# Patient Record
Sex: FEMALE | Race: WHITE | NOT HISPANIC OR LATINO | Employment: FULL TIME | URBAN - METROPOLITAN AREA
[De-identification: names, ages, dates, MRNs, and addresses within clinical notes are randomized per-mention and may not be internally consistent; named-entity substitution may affect disease eponyms.]

---

## 2017-10-05 ENCOUNTER — APPOINTMENT (OUTPATIENT)
Dept: CT IMAGING | Facility: HOSPITAL | Age: 42
End: 2017-10-05

## 2017-10-05 ENCOUNTER — HOSPITAL ENCOUNTER (EMERGENCY)
Facility: HOSPITAL | Age: 42
Discharge: HOME | End: 2017-10-05
Attending: EMERGENCY MEDICINE | Admitting: EMERGENCY MEDICINE

## 2017-10-05 VITALS
DIASTOLIC BLOOD PRESSURE: 85 MMHG | TEMPERATURE: 98.1 F | WEIGHT: 170 LBS | RESPIRATION RATE: 18 BRPM | HEIGHT: 67 IN | OXYGEN SATURATION: 99 % | HEART RATE: 84 BPM | SYSTOLIC BLOOD PRESSURE: 124 MMHG | BODY MASS INDEX: 26.68 KG/M2

## 2017-10-05 DIAGNOSIS — K63.89 EPIPLOIC APPENDAGITIS: Primary | ICD-10-CM

## 2017-10-05 LAB
ALBUMIN SERPL-MCNC: 3.9 G/DL (ref 3.5–5.2)
ALBUMIN/GLOB SERPL: 1.5 G/DL
ALP SERPL-CCNC: 70 U/L (ref 40–129)
ALT SERPL W P-5'-P-CCNC: 12 U/L (ref 5–33)
ANION GAP SERPL CALCULATED.3IONS-SCNC: 11.7 MMOL/L
AST SERPL-CCNC: 13 U/L (ref 5–32)
BACTERIA UR QL AUTO: ABNORMAL /HPF
BASOPHILS # BLD AUTO: 0.04 10*3/MM3 (ref 0–0.2)
BASOPHILS NFR BLD AUTO: 0.6 % (ref 0–2)
BILIRUB SERPL-MCNC: 0.7 MG/DL (ref 0.2–1.2)
BILIRUB UR QL STRIP: NEGATIVE
BUN BLD-MCNC: 2 MG/DL (ref 6–20)
BUN/CREAT SERPL: 2.9 (ref 7–25)
CALCIUM SPEC-SCNC: 9.1 MG/DL (ref 8.6–10.5)
CHLORIDE SERPL-SCNC: 103 MMOL/L (ref 98–107)
CLARITY UR: CLEAR
CO2 SERPL-SCNC: 26.3 MMOL/L (ref 22–29)
COLOR UR: YELLOW
CREAT BLD-MCNC: 0.68 MG/DL (ref 0.57–1)
DEPRECATED RDW RBC AUTO: 44 FL (ref 37–54)
EOSINOPHIL # BLD AUTO: 0.25 10*3/MM3 (ref 0.1–0.3)
EOSINOPHIL NFR BLD AUTO: 3.9 % (ref 0–4)
ERYTHROCYTE [DISTWIDTH] IN BLOOD BY AUTOMATED COUNT: 12.8 % (ref 11.5–14.5)
GFR SERPL CREATININE-BSD FRML MDRD: 94 ML/MIN/1.73
GLOBULIN UR ELPH-MCNC: 2.6 GM/DL
GLUCOSE BLD-MCNC: 115 MG/DL (ref 65–99)
GLUCOSE UR STRIP-MCNC: NEGATIVE MG/DL
HCT VFR BLD AUTO: 41.5 % (ref 37–47)
HGB BLD-MCNC: 13.7 G/DL (ref 12–16)
HGB UR QL STRIP.AUTO: ABNORMAL
HYALINE CASTS UR QL AUTO: ABNORMAL /LPF
IMM GRANULOCYTES # BLD: 0.01 10*3/MM3 (ref 0–0.03)
IMM GRANULOCYTES NFR BLD: 0.2 % (ref 0–0.5)
KETONES UR QL STRIP: NEGATIVE
LEUKOCYTE ESTERASE UR QL STRIP.AUTO: NEGATIVE
LYMPHOCYTES # BLD AUTO: 2.31 10*3/MM3 (ref 0.6–4.8)
LYMPHOCYTES NFR BLD AUTO: 35.9 % (ref 20–45)
MCH RBC QN AUTO: 31 PG (ref 27–31)
MCHC RBC AUTO-ENTMCNC: 33 G/DL (ref 31–37)
MCV RBC AUTO: 93.9 FL (ref 81–99)
MONOCYTES # BLD AUTO: 0.4 10*3/MM3 (ref 0–1)
MONOCYTES NFR BLD AUTO: 6.2 % (ref 3–8)
NEUTROPHILS # BLD AUTO: 3.42 10*3/MM3 (ref 1.5–8.3)
NEUTROPHILS NFR BLD AUTO: 53.2 % (ref 45–70)
NITRITE UR QL STRIP: NEGATIVE
NRBC BLD MANUAL-RTO: 0 /100 WBC (ref 0–0)
PH UR STRIP.AUTO: 7.5 [PH] (ref 4.5–8)
PLATELET # BLD AUTO: 240 10*3/MM3 (ref 140–500)
PMV BLD AUTO: 9.5 FL (ref 7.4–10.4)
POTASSIUM BLD-SCNC: 3.8 MMOL/L (ref 3.5–5.2)
PROT SERPL-MCNC: 6.5 G/DL (ref 6–8.5)
PROT UR QL STRIP: NEGATIVE
RBC # BLD AUTO: 4.42 10*6/MM3 (ref 4.2–5.4)
RBC # UR: ABNORMAL /HPF
REF LAB TEST METHOD: ABNORMAL
SODIUM BLD-SCNC: 141 MMOL/L (ref 136–145)
SP GR UR STRIP: 1.01 (ref 1–1.03)
SQUAMOUS #/AREA URNS HPF: ABNORMAL /HPF
UROBILINOGEN UR QL STRIP: ABNORMAL
WBC NRBC COR # BLD: 6.43 10*3/MM3 (ref 4.8–10.8)
WBC UR QL AUTO: ABNORMAL /HPF

## 2017-10-05 PROCEDURE — 96376 TX/PRO/DX INJ SAME DRUG ADON: CPT

## 2017-10-05 PROCEDURE — 99284 EMERGENCY DEPT VISIT MOD MDM: CPT

## 2017-10-05 PROCEDURE — 85025 COMPLETE CBC W/AUTO DIFF WBC: CPT | Performed by: EMERGENCY MEDICINE

## 2017-10-05 PROCEDURE — 74177 CT ABD & PELVIS W/CONTRAST: CPT

## 2017-10-05 PROCEDURE — 96375 TX/PRO/DX INJ NEW DRUG ADDON: CPT

## 2017-10-05 PROCEDURE — 0 IOPAMIDOL PER 1 ML: Performed by: EMERGENCY MEDICINE

## 2017-10-05 PROCEDURE — 80053 COMPREHEN METABOLIC PANEL: CPT | Performed by: EMERGENCY MEDICINE

## 2017-10-05 PROCEDURE — 99284 EMERGENCY DEPT VISIT MOD MDM: CPT | Performed by: EMERGENCY MEDICINE

## 2017-10-05 PROCEDURE — 81001 URINALYSIS AUTO W/SCOPE: CPT | Performed by: EMERGENCY MEDICINE

## 2017-10-05 PROCEDURE — 25010000002 MORPHINE PER 10 MG: Performed by: EMERGENCY MEDICINE

## 2017-10-05 PROCEDURE — 0 DIATRIZOATE MEGLUMINE & SODIUM PER 1 ML: Performed by: EMERGENCY MEDICINE

## 2017-10-05 PROCEDURE — 25010000002 ONDANSETRON PER 1 MG: Performed by: EMERGENCY MEDICINE

## 2017-10-05 PROCEDURE — 96374 THER/PROPH/DIAG INJ IV PUSH: CPT

## 2017-10-05 RX ORDER — ONDANSETRON 2 MG/ML
4 INJECTION INTRAMUSCULAR; INTRAVENOUS EVERY 6 HOURS PRN
Status: DISCONTINUED | OUTPATIENT
Start: 2017-10-05 | End: 2017-10-06 | Stop reason: HOSPADM

## 2017-10-05 RX ORDER — ONDANSETRON 2 MG/ML
4 INJECTION INTRAMUSCULAR; INTRAVENOUS ONCE
Status: COMPLETED | OUTPATIENT
Start: 2017-10-05 | End: 2017-10-05

## 2017-10-05 RX ORDER — OXYCODONE HYDROCHLORIDE AND ACETAMINOPHEN 5; 325 MG/1; MG/1
1 TABLET ORAL EVERY 6 HOURS PRN
Qty: 25 TABLET | Refills: 0 | Status: SHIPPED | OUTPATIENT
Start: 2017-10-05 | End: 2017-11-01

## 2017-10-05 RX ORDER — ONDANSETRON 4 MG/1
4 TABLET, ORALLY DISINTEGRATING ORAL EVERY 6 HOURS PRN
Qty: 20 TABLET | Refills: 0 | Status: SHIPPED | OUTPATIENT
Start: 2017-10-05 | End: 2017-11-01 | Stop reason: ALTCHOICE

## 2017-10-05 RX ORDER — OXYCODONE HYDROCHLORIDE AND ACETAMINOPHEN 5; 325 MG/1; MG/1
2 TABLET ORAL ONCE
Status: COMPLETED | OUTPATIENT
Start: 2017-10-05 | End: 2017-10-05

## 2017-10-05 RX ADMIN — DIATRIZOATE MEGLUMINE AND DIATRIZOATE SODIUM 30 ML: 600; 100 SOLUTION ORAL; RECTAL at 19:10

## 2017-10-05 RX ADMIN — IOPAMIDOL 100 ML: 755 INJECTION, SOLUTION INTRAVENOUS at 21:19

## 2017-10-05 RX ADMIN — MORPHINE SULFATE 4 MG: 4 INJECTION, SOLUTION INTRAMUSCULAR; INTRAVENOUS at 20:47

## 2017-10-05 RX ADMIN — OXYCODONE HYDROCHLORIDE AND ACETAMINOPHEN 2 TABLET: 5; 325 TABLET ORAL at 22:40

## 2017-10-05 RX ADMIN — MORPHINE SULFATE 4 MG: 4 INJECTION, SOLUTION INTRAMUSCULAR; INTRAVENOUS at 19:00

## 2017-10-05 RX ADMIN — ONDANSETRON 4 MG: 2 INJECTION, SOLUTION INTRAMUSCULAR; INTRAVENOUS at 19:01

## 2017-10-05 RX ADMIN — ONDANSETRON 4 MG: 2 INJECTION, SOLUTION INTRAMUSCULAR; INTRAVENOUS at 20:46

## 2017-10-05 NOTE — ED NOTES
"Pt continues to work on oral contrast. States that pain has come down \"a lot\" since taking pain medicine. Rates abd pain 4/10     Colleen Wolfe RN  10/05/17 1934    "

## 2017-10-06 NOTE — ED PROVIDER NOTES
Subjective   Patient is a 41 y.o. female presenting with abdominal pain.   History provided by:  Patient  Abdominal Pain   Pain location:  LLQ  Pain quality: sharp    Pain radiates to:  Does not radiate  Pain severity:  Moderate  Onset quality:  Gradual  Timing:  Constant  Progression:  Worsening  Context comment:  As described below.  Relieved by:  Nothing  Worsened by:  Palpation  Associated symptoms: anorexia and nausea    Associated symptoms: no belching, no chest pain, no chills, no constipation, no cough, no diarrhea, no dysuria, no fever, no hematuria, no shortness of breath, no vaginal bleeding, no vaginal discharge and no vomiting    Risk factors: multiple surgeries      HPI Narrative:Ms. Zelaya is a 42 yo WF arisen secondary to left lower quadrant pain.  Onset of pain last night.  The pain has progressively worsened.  Patient is a mild nausea but no vomiting.  Patient has been told in the past that she had diverticulosis.  She is concerned that she may have diverticulitis.  Patient presents for evaluation.        Review of Systems   Constitutional: Negative.  Negative for appetite change, chills, diaphoresis and fever.   HENT: Negative.    Eyes: Negative.    Respiratory: Negative for cough, chest tightness, shortness of breath and wheezing.    Cardiovascular: Negative for chest pain, palpitations and leg swelling.   Gastrointestinal: Positive for abdominal pain, anorexia and nausea. Negative for constipation, diarrhea and vomiting.   Genitourinary: Negative.  Negative for difficulty urinating, dysuria, flank pain, frequency, hematuria, vaginal bleeding and vaginal discharge.   Musculoskeletal: Negative.    Skin: Negative.  Negative for color change, pallor and rash.   Neurological: Negative.  Negative for dizziness, seizures, syncope and headaches.   Psychiatric/Behavioral: Negative.  Negative for agitation, behavioral problems and hallucinations.       Past Medical History:   Diagnosis Date   • Anemia     • Diverticulitis    • GERD (gastroesophageal reflux disease)    • Hiatal hernia    • Menstrual abnormality     SCHEDULED FOR SX   • Migraine    • Ulcer of abdomen wall        No Known Allergies    Past Surgical History:   Procedure Laterality Date   • CHOLECYSTECTOMY N/A 9/23/2016    Procedure: CHOLECYSTECTOMY LAPAROSCOPIC;  Surgeon: Cordell Dejesus MD;  Location: [facility];  Service:    • D&C HYSTEROSCOPY MYOSURE N/A 8/10/2016    Procedure: DILATATION AND CURETTAGE HYSTEROSCOPY ;  Surgeon: Frank Alvarado MD;  Location: [facility] OR;  Service:    • DIAGNOSTIC LAPAROSCOPY     • ECTOPIC PREGNANCY SURGERY     • FOOT SURGERY Right     x3   • HYSTERECTOMY     • OR LAP, RADICAL HYST W/ TUBE&OV, NODE BX N/A 11/28/2016    Procedure: TOTAL LAPAROSCOPIC HYSTERECTOMY, bilateral salpingectomy, ablation of peritoneal endometriosis;  Surgeon: Frank Alvarado MD;  Location: [facility];  Service: Obstetrics/Gynecology   • TUBAL ABDOMINAL LIGATION         Family History   Problem Relation Age of Onset   • Hypertension Mother    • Cancer Father    • Hypertension Father        Social History     Social History   • Marital status: Single     Spouse name: N/A   • Number of children: N/A   • Years of education: N/A     Social History Main Topics   • Smoking status: Current Every Day Smoker     Packs/day: 1.00     Last attempt to quit: 7/17/2016   • Smokeless tobacco: Never Used   • Alcohol use No   • Drug use: No   • Sexual activity: Defer     Other Topics Concern   • None     Social History Narrative   • None             Objective   Physical Exam   Constitutional: She is oriented to person, place, and time. She appears well-developed and well-nourished. No distress.   41-year-old white female laying in bed.  Patient appears in pain but in good overall health.   HENT:   Head: Normocephalic and atraumatic.   Right Ear: External ear normal.   Left Ear: External ear normal.   Nose: Nose normal.   Mouth/Throat: Oropharynx  is clear and moist.   Eyes: Conjunctivae and EOM are normal. Pupils are equal, round, and reactive to light.   Neck: Normal range of motion. Neck supple.   Cardiovascular: Normal rate, regular rhythm, normal heart sounds and intact distal pulses.  Exam reveals no gallop and no friction rub.    No murmur heard.  Pulmonary/Chest: Effort normal and breath sounds normal.   Abdominal: Soft. Bowel sounds are normal. She exhibits no distension. There is no hepatosplenomegaly. There is tenderness in the left lower quadrant. There is no rigidity, no rebound, no guarding, no CVA tenderness, no tenderness at McBurney's point and negative Nuno's sign. No hernia.   Musculoskeletal: Normal range of motion.   Neurological: She is alert and oriented to person, place, and time. She has normal reflexes.   Skin: Skin is warm and dry. She is not diaphoretic.   Psychiatric: She has a normal mood and affect. Her behavior is normal.   Nursing note and vitals reviewed.      Procedures         ED Course  ED Course   Comment By Time   10/05/17  6:52 PM  Onset of left lower quadrant pain last night.  Associated nausea.  Physical exam consistent with diverticulitis.  Given pain medication, antiemetics obtaining CT. Tor Clarke MD 10/05 1852   10/05/17  8:31 PM  Patient's lab work is unremarkable.  A urine sample was contaminated.  No evidence of UTI.  Patient feeling better after morphine and Zofran.  Awaiting CT. Tor Clarke MD 10/05 2031   10/05/17  10:33 PM  Shows epiploic appendagitis in distal descending colon.  This is consistent with patient's symptoms.  Will prescribe pain and nausea medicine as needed for home.  This is a self-limiting disease.  This treatment is symptomatic. Tor Clarke MD 10/05 1914      Labs Reviewed   URINALYSIS W/ CULTURE IF INDICATED - Abnormal; Notable for the following:        Result Value    Blood, UA Trace (*)     All other components within normal limits   URINALYSIS, MICROSCOPIC ONLY -  Abnormal; Notable for the following:     RBC, UA 6-12 (*)     WBC, UA 3-5 (*)     Squamous Epithelial Cells, UA 3-6 (*)     All other components within normal limits   COMPREHENSIVE METABOLIC PANEL - Abnormal; Notable for the following:     Glucose 115 (*)     BUN 2 (*)     BUN/Creatinine Ratio 2.9 (*)     All other components within normal limits   CBC WITH AUTO DIFFERENTIAL - Normal   CBC AND DIFFERENTIAL    Narrative:     The following orders were created for panel order CBC & Differential.  Procedure                               Abnormality         Status                     ---------                               -----------         ------                     CBC Auto Differential[[id number]]         Normal              Final result                 Please view results for these tests on the individual orders.     My differential diagnosis for abdominal pain includes but is not limited to:  Gastritis, gastroenteritis, peptic ulcer disease, GERD, esophageal perforation, acute appendicitis, mesenteric adenitis, Meckel’s diverticulum, epiploic appendagitis, diverticulitis, colon cancer, ulcerative colitis, Crohn’s disease, intussusception, small bowel obstruction, adhesions, ischemic bowel, perforated viscus, ileus, obstipation, biliary colic, cholecystitis, cholelithiasis, Von-Roberto Abner, hepatitis, pancreatitis, common bile duct obstruction, cholangitis, bile leak, splenic trauma, splenic rupture, splenic infarction, splenic abscess, abdominal abscess, ascites, spontaneous bacterial peritonitis, hernia, UTI, cystitis, prostatitis, ureterolithiasis, urinary obstruction, ovarian cyst, torsion, pregnancy, ectopic pregnancy, PID, pelvic abscess, mittelschmerz, endometriosis, AAA, myocardial infarction, pneumonia, cancer, porphyria, DKA, medications, sickle cell, viral syndrome, zoster            MDM  Number of Diagnoses or Management Options  Epiploic appendagitis: new and requires workup     Amount and/or  Complexity of Data Reviewed  Clinical lab tests: reviewed and ordered  Tests in the radiology section of CPT®: reviewed and ordered  Independent visualization of images, tracings, or specimens: yes    Risk of Complications, Morbidity, and/or Mortality  Presenting problems: moderate  Diagnostic procedures: high  Management options: low    Patient Progress  Patient progress: improved      Final diagnoses:   Epiploic appendagitis            Tor Clarke MD  10/06/17 0318

## 2017-10-06 NOTE — ED NOTES
Pt appears tearful in bed. Family at bedside. States that her pain went away briefly and is now coming back. Rates pain 6/10.     Colleen Wolfe RN  10/05/17 4938

## 2017-10-06 NOTE — DISCHARGE INSTRUCTIONS
Medication as directed.  Follow-up with PMD as above.  Return to ED for worsening symptoms, medical emergencies.

## 2017-11-01 ENCOUNTER — OFFICE VISIT (OUTPATIENT)
Dept: SURGERY | Facility: CLINIC | Age: 42
End: 2017-11-01

## 2017-11-01 VITALS
BODY MASS INDEX: 27 KG/M2 | SYSTOLIC BLOOD PRESSURE: 118 MMHG | HEART RATE: 72 BPM | RESPIRATION RATE: 16 BRPM | DIASTOLIC BLOOD PRESSURE: 74 MMHG | HEIGHT: 66 IN | WEIGHT: 168 LBS

## 2017-11-01 DIAGNOSIS — R10.32 LLQ PAIN: Primary | ICD-10-CM

## 2017-11-01 DIAGNOSIS — R11.0 NAUSEA: ICD-10-CM

## 2017-11-01 DIAGNOSIS — R19.7 DIARRHEA, UNSPECIFIED TYPE: ICD-10-CM

## 2017-11-01 DIAGNOSIS — R63.0 LOSS OF APPETITE: ICD-10-CM

## 2017-11-01 DIAGNOSIS — R14.0 BLOATING: ICD-10-CM

## 2017-11-01 PROCEDURE — 99204 OFFICE O/P NEW MOD 45 MIN: CPT | Performed by: SURGERY

## 2017-11-01 NOTE — PROGRESS NOTES
Ingrid Zelaya 42 y.o. female presents @ the req of RANDI Baldwin for eval of LLQ pain.  Pt reports + pain X sev days and nausea.+ bloating, chg in bowel habits, and loss of appetite.  Pt was sent to [facility] for CT recently.        HPI   Above noted and agree.  On October 5, this very pleasant patient developed severe LLQ pain.  It was sharp, severe, and constant.  She was sent to [place] [facility] and had a CT scan which showed epiploic appendigitis.  Her severe pain improved.  Now she has transient LLQ pain.  She gets nauseated after eating and gets severe diarrhea as well.  She has lost her appetite and gets very bloated.  Her last colonoscopy and EGD were 11 years ago.  She denies fevers or chills.  She has no chest pain or shortness of breath.        Review of Systems   All other systems reviewed and are negative.          Past Medical History:   Diagnosis Date   • Anemia    • Diverticulitis    • GERD (gastroesophageal reflux disease)    • Hiatal hernia    • Menstrual abnormality     SCHEDULED FOR SX   • Migraine    • Ulcer of abdomen wall            Past Surgical History:   Procedure Laterality Date   • CHOLECYSTECTOMY N/A 9/23/2016    Procedure: CHOLECYSTECTOMY LAPAROSCOPIC;  Surgeon: Cordell Dejesus MD;  Location: [facility];  Service:    • D&C HYSTEROSCOPY MYOSURE N/A 8/10/2016    Procedure: DILATATION AND CURETTAGE HYSTEROSCOPY ;  Surgeon: Frank Alvarado MD;  Location: [facility] OR;  Service:    • DIAGNOSTIC LAPAROSCOPY     • ECTOPIC PREGNANCY SURGERY     • FOOT SURGERY Right     x3   • HYSTERECTOMY     • CT LAP, RADICAL HYST W/ TUBE&OV, NODE BX N/A 11/28/2016    Procedure: TOTAL LAPAROSCOPIC HYSTERECTOMY, bilateral salpingectomy, ablation of peritoneal endometriosis;  Surgeon: Frank Alvarado MD;  Location: [facility];  Service: Obstetrics/Gynecology   • TUBAL ABDOMINAL LIGATION             Physical Exam   Constitutional: She is oriented to person, place, and time. She appears well-developed  "and well-nourished.   HENT:   Head: Normocephalic and atraumatic.   Neck: Neck supple.   Cardiovascular: Normal rate and regular rhythm.    Pulmonary/Chest: Effort normal and breath sounds normal.   Abdominal: Soft. Bowel sounds are normal.   LLQ tenderness of palpation   Musculoskeletal: She exhibits no edema.   Neurological: She is alert and oriented to person, place, and time.   Skin: Skin is warm and dry.   Psychiatric: She has a normal mood and affect. Her behavior is normal.   Nursing note and vitals reviewed.          /74  Pulse 72  Resp 16  Ht 66\" (167.6 cm)  Wt 168 lb (76.2 kg)  LMP 11/24/2016  BMI 27.12 kg/m2        Ingrid was seen today for abdominal pain.    Diagnoses and all orders for this visit:    LLQ pain    Nausea    Diarrhea, unspecified type    Bloating    Loss of appetite    We will schedule Ingrid for an EGD and colonoscopy.  I discussed with the patient the benefits and risks of performing endoscopy.  Benefits and risks were not limited to but including bleeding, infection, perforation, complications of anesthesia, aspiration.  The patient appeared to understand and is willing to proceed.    Thank you for allowing me to participate in the care of this interesting patient.      "

## 2017-11-01 NOTE — H&P
Ingrid Zelaya 42 y.o. female presents @ the req of RANDI Baldwin for eval of LLQ pain.  Pt reports + pain X sev days and nausea.+ bloating, chg in bowel habits, and loss of appetite.  Pt was sent to [facility] for CT recently.        HPI   Above noted and agree.  On October 5, this very pleasant patient developed severe LLQ pain.  It was sharp, severe, and constant.  She was sent to [place] ER and had a CT scan which showed epiploic appendigitis.  Her severe pain improved.  Now she has transient LLQ pain.  She gets nauseated after eating and gets severe diarrhea as well.  She has lost her appetite and gets very bloated.  Her last colonoscopy and EGD were 11 years ago.  She denies fevers or chills.  She has no chest pain or shortness of breath.        Review of Systems   All other systems reviewed and are negative.          Past Medical History:   Diagnosis Date   • Anemia    • Diverticulitis    • GERD (gastroesophageal reflux disease)    • Hiatal hernia    • Menstrual abnormality     SCHEDULED FOR SX   • Migraine    • Ulcer of abdomen wall            Past Surgical History:   Procedure Laterality Date   • CHOLECYSTECTOMY N/A 9/23/2016    Procedure: CHOLECYSTECTOMY LAPAROSCOPIC;  Surgeon: Cordell Dejesus MD;  Location: [facility];  Service:    • D&C HYSTEROSCOPY MYOSURE N/A 8/10/2016    Procedure: DILATATION AND CURETTAGE HYSTEROSCOPY ;  Surgeon: Frank Alvarado MD;  Location: [facility] OR;  Service:    • DIAGNOSTIC LAPAROSCOPY     • ECTOPIC PREGNANCY SURGERY     • FOOT SURGERY Right     x3   • HYSTERECTOMY     • [place] LAP, RADICAL HYST W/ TUBE&OV, NODE BX N/A 11/28/2016    Procedure: TOTAL LAPAROSCOPIC HYSTERECTOMY, bilateral salpingectomy, ablation of peritoneal endometriosis;  Surgeon: Frank Alvarado MD;  Location: [facility];  Service: Obstetrics/Gynecology   • TUBAL ABDOMINAL LIGATION             Physical Exam   Constitutional: She is oriented to person, place, and time. She appears well-developed  "and well-nourished.   HENT:   Head: Normocephalic and atraumatic.   Neck: Neck supple.   Cardiovascular: Normal rate and regular rhythm.    Pulmonary/Chest: Effort normal and breath sounds normal.   Abdominal: Soft. Bowel sounds are normal.   LLQ tenderness of palpation   Musculoskeletal: She exhibits no edema.   Neurological: She is alert and oriented to person, place, and time.   Skin: Skin is warm and dry.   Psychiatric: She has a normal mood and affect. Her behavior is normal.   Nursing note and vitals reviewed.          /74  Pulse 72  Resp 16  Ht 66\" (167.6 cm)  Wt 168 lb (76.2 kg)  LMP 11/24/2016  BMI 27.12 kg/m2        Ingrid was seen today for abdominal pain.    Diagnoses and all orders for this visit:    LLQ pain    Nausea    Diarrhea, unspecified type    Bloating    Loss of appetite    We will schedule Ingrid for an EGD and colonoscopy.  I discussed with the patient the benefits and risks of performing endoscopy.  Benefits and risks were not limited to but including bleeding, infection, perforation, complications of anesthesia, aspiration.  The patient appeared to understand and is willing to proceed.    Thank you for allowing me to participate in the care of this interesting patient.        "

## 2017-11-13 ENCOUNTER — ANESTHESIA EVENT (OUTPATIENT)
Dept: PERIOP | Facility: HOSPITAL | Age: 42
End: 2017-11-13

## 2017-11-14 ENCOUNTER — HOSPITAL ENCOUNTER (OUTPATIENT)
Facility: HOSPITAL | Age: 42
Setting detail: HOSPITAL OUTPATIENT SURGERY
Discharge: HOME | End: 2017-11-14
Attending: SURGERY | Admitting: SURGERY

## 2017-11-14 ENCOUNTER — ANESTHESIA (OUTPATIENT)
Dept: PERIOP | Facility: HOSPITAL | Age: 42
End: 2017-11-14

## 2017-11-14 VITALS
TEMPERATURE: 97.7 F | DIASTOLIC BLOOD PRESSURE: 65 MMHG | RESPIRATION RATE: 12 BRPM | SYSTOLIC BLOOD PRESSURE: 102 MMHG | HEART RATE: 72 BPM | OXYGEN SATURATION: 97 %

## 2017-11-14 DIAGNOSIS — R19.7 DIARRHEA, UNSPECIFIED TYPE: ICD-10-CM

## 2017-11-14 DIAGNOSIS — R14.0 BLOATING: ICD-10-CM

## 2017-11-14 DIAGNOSIS — R10.32 LLQ PAIN: ICD-10-CM

## 2017-11-14 DIAGNOSIS — R11.0 NAUSEA: ICD-10-CM

## 2017-11-14 DIAGNOSIS — R63.0 LOSS OF APPETITE: ICD-10-CM

## 2017-11-14 PROCEDURE — 45385 COLONOSCOPY W/LESION REMOVAL: CPT | Performed by: SURGERY

## 2017-11-14 PROCEDURE — 87081 CULTURE SCREEN ONLY: CPT | Performed by: SURGERY

## 2017-11-14 PROCEDURE — 43239 EGD BIOPSY SINGLE/MULTIPLE: CPT | Performed by: SURGERY

## 2017-11-14 PROCEDURE — 45380 COLONOSCOPY AND BIOPSY: CPT | Performed by: SURGERY

## 2017-11-14 PROCEDURE — 25010000002 PROPOFOL 10 MG/ML EMULSION: Performed by: NURSE ANESTHETIST, CERTIFIED REGISTERED

## 2017-11-14 RX ORDER — SODIUM CHLORIDE 9 MG/ML
40 INJECTION, SOLUTION INTRAVENOUS AS NEEDED
Status: DISCONTINUED | OUTPATIENT
Start: 2017-11-14 | End: 2017-11-14 | Stop reason: HOSPADM

## 2017-11-14 RX ORDER — LIDOCAINE HYDROCHLORIDE 20 MG/ML
INJECTION, SOLUTION INFILTRATION; PERINEURAL AS NEEDED
Status: DISCONTINUED | OUTPATIENT
Start: 2017-11-14 | End: 2017-11-14 | Stop reason: SURG

## 2017-11-14 RX ORDER — SODIUM CHLORIDE, SODIUM LACTATE, POTASSIUM CHLORIDE, CALCIUM CHLORIDE 600; 310; 30; 20 MG/100ML; MG/100ML; MG/100ML; MG/100ML
9 INJECTION, SOLUTION INTRAVENOUS CONTINUOUS PRN
Status: DISCONTINUED | OUTPATIENT
Start: 2017-11-14 | End: 2017-11-14 | Stop reason: HOSPADM

## 2017-11-14 RX ORDER — SODIUM CHLORIDE 0.9 % (FLUSH) 0.9 %
1-10 SYRINGE (ML) INJECTION AS NEEDED
Status: DISCONTINUED | OUTPATIENT
Start: 2017-11-14 | End: 2017-11-14 | Stop reason: HOSPADM

## 2017-11-14 RX ORDER — LIDOCAINE HYDROCHLORIDE 10 MG/ML
0.5 INJECTION, SOLUTION EPIDURAL; INFILTRATION; INTRACAUDAL; PERINEURAL ONCE AS NEEDED
Status: COMPLETED | OUTPATIENT
Start: 2017-11-14 | End: 2017-11-14

## 2017-11-14 RX ORDER — PROPOFOL 10 MG/ML
VIAL (ML) INTRAVENOUS AS NEEDED
Status: DISCONTINUED | OUTPATIENT
Start: 2017-11-14 | End: 2017-11-14 | Stop reason: SURG

## 2017-11-14 RX ORDER — MAGNESIUM HYDROXIDE 1200 MG/15ML
LIQUID ORAL AS NEEDED
Status: DISCONTINUED | OUTPATIENT
Start: 2017-11-14 | End: 2017-11-14 | Stop reason: HOSPADM

## 2017-11-14 RX ADMIN — SODIUM CHLORIDE, POTASSIUM CHLORIDE, SODIUM LACTATE AND CALCIUM CHLORIDE: 600; 310; 30; 20 INJECTION, SOLUTION INTRAVENOUS at 09:15

## 2017-11-14 RX ADMIN — PROPOFOL 50 MG: 10 INJECTION, EMULSION INTRAVENOUS at 09:26

## 2017-11-14 RX ADMIN — PROPOFOL 50 MG: 10 INJECTION, EMULSION INTRAVENOUS at 09:23

## 2017-11-14 RX ADMIN — PROPOFOL 50 MG: 10 INJECTION, EMULSION INTRAVENOUS at 09:22

## 2017-11-14 RX ADMIN — PROPOFOL 50 MG: 10 INJECTION, EMULSION INTRAVENOUS at 09:30

## 2017-11-14 RX ADMIN — LIDOCAINE HYDROCHLORIDE 100 MG: 20 INJECTION, SOLUTION INFILTRATION; PERINEURAL at 09:21

## 2017-11-14 RX ADMIN — SODIUM CHLORIDE, POTASSIUM CHLORIDE, SODIUM LACTATE AND CALCIUM CHLORIDE 9 ML/HR: 600; 310; 30; 20 INJECTION, SOLUTION INTRAVENOUS at 08:52

## 2017-11-14 RX ADMIN — PROPOFOL 50 MG: 10 INJECTION, EMULSION INTRAVENOUS at 09:21

## 2017-11-14 RX ADMIN — LIDOCAINE HYDROCHLORIDE 0.5 ML: 10 INJECTION, SOLUTION EPIDURAL; INFILTRATION; INTRACAUDAL; PERINEURAL at 08:51

## 2017-11-14 RX ADMIN — PROPOFOL 50 MG: 10 INJECTION, EMULSION INTRAVENOUS at 09:34

## 2017-11-14 NOTE — PLAN OF CARE
Problem: Patient Care Overview (Adult)  Goal: Adult Individualization and Mutuality  Outcome: Ongoing (interventions implemented as appropriate)    11/14/17 0902   Individualization   Patient Specific Preferences goes by alexx   Patient Specific Goals go home today   Patient Specific Interventions none needed   Mutuality/Individual Preferences   What Anxieties, Fears or Concerns Do You Have About Your Health or Care? afaid of being awake   What Questions Do You Have About Your Health or Care? none    What Information Would Help Us Give You More Personalized Care? i was awake ten years ago when they did this

## 2017-11-14 NOTE — ANESTHESIA POSTPROCEDURE EVALUATION
Patient: Ingrid Zelaya    Procedure Summary     Date Anesthesia Start Anesthesia Stop Room / Location    11/14/17 0916 0941 [facility]       Procedure Diagnosis Surgeon Provider    ESOPHAGOGASTRODUODENOSCOPY  with gastric biopsy for  SHERI TEST, POLYPECTOMY  (N/A Esophagus); COLONOSCOPY,   polypectomy  (N/A ) Loss of appetite; Nausea; Bloating; LLQ pain; Diarrhea, unspecified type  (Loss of appetite [R63.0]; Nausea [R11.0]; Bloating [R14.0]; LLQ pain [R10.32]; Diarrhea, unspecified type [R19.7]) DO Tommy Davenport, CRNA          Anesthesia Type: MAC  Last vitals  BP   102/65 (11/14/17 0955)   Temp   97.7 °F (36.5 °C) (11/14/17 0955)   Pulse   72 (11/14/17 0955)   Resp   12 (11/14/17 0955)     SpO2   97 % (11/14/17 0955)     Post Anesthesia Care and Evaluation    Patient location during evaluation: PHASE II  Patient participation: complete - patient participated  Level of consciousness: awake and alert  Pain score: 0  Pain management: adequate  Airway patency: patent  Anesthetic complications: No anesthetic complications  PONV Status: none  Cardiovascular status: acceptable  Respiratory status: acceptable  Hydration status: acceptable

## 2017-11-14 NOTE — H&P (VIEW-ONLY)
Ingrid Zleaya 42 y.o. female presents @ the req of RANDI Baldwin for eval of LLQ pain.  Pt reports + pain X sev days and nausea.+ bloating, chg in bowel habits, and loss of appetite.  Pt was sent to [facility] for CT recently.        HPI   Above noted and agree.  On October 5, this very pleasant patient developed severe LLQ pain.  It was sharp, severe, and constant.  She was sent to [place] ER and had a CT scan which showed epiploic appendigitis.  Her severe pain improved.  Now she has transient LLQ pain.  She gets nauseated after eating and gets severe diarrhea as well.  She has lost her appetite and gets very bloated.  Her last colonoscopy and EGD were 11 years ago.  She denies fevers or chills.  She has no chest pain or shortness of breath.        Review of Systems   All other systems reviewed and are negative.          Past Medical History:   Diagnosis Date   • Anemia    • Diverticulitis    • GERD (gastroesophageal reflux disease)    • Hiatal hernia    • Menstrual abnormality     SCHEDULED FOR SX   • Migraine    • Ulcer of abdomen wall            Past Surgical History:   Procedure Laterality Date   • CHOLECYSTECTOMY N/A 9/23/2016    Procedure: CHOLECYSTECTOMY LAPAROSCOPIC;  Surgeon: Cordell Dejesus MD;  Location: [facility];  Service:    • D&C HYSTEROSCOPY MYOSURE N/A 8/10/2016    Procedure: DILATATION AND CURETTAGE HYSTEROSCOPY ;  Surgeon: Frank Alvarado MD;  Location: [facility] OR;  Service:    • DIAGNOSTIC LAPAROSCOPY     • ECTOPIC PREGNANCY SURGERY     • FOOT SURGERY Right     x3   • HYSTERECTOMY     • [place] LAP, RADICAL HYST W/ TUBE&OV, NODE BX N/A 11/28/2016    Procedure: TOTAL LAPAROSCOPIC HYSTERECTOMY, bilateral salpingectomy, ablation of peritoneal endometriosis;  Surgeon: Frank Alvarado MD;  Location: [facility];  Service: Obstetrics/Gynecology   • TUBAL ABDOMINAL LIGATION             Physical Exam   Constitutional: She is oriented to person, place, and time. She appears well-developed  "and well-nourished.   HENT:   Head: Normocephalic and atraumatic.   Neck: Neck supple.   Cardiovascular: Normal rate and regular rhythm.    Pulmonary/Chest: Effort normal and breath sounds normal.   Abdominal: Soft. Bowel sounds are normal.   LLQ tenderness of palpation   Musculoskeletal: She exhibits no edema.   Neurological: She is alert and oriented to person, place, and time.   Skin: Skin is warm and dry.   Psychiatric: She has a normal mood and affect. Her behavior is normal.   Nursing note and vitals reviewed.          /74  Pulse 72  Resp 16  Ht 66\" (167.6 cm)  Wt 168 lb (76.2 kg)  LMP 11/24/2016  BMI 27.12 kg/m2        Ingrid was seen today for abdominal pain.    Diagnoses and all orders for this visit:    LLQ pain    Nausea    Diarrhea, unspecified type    Bloating    Loss of appetite    We will schedule Ingrid for an EGD and colonoscopy.  I discussed with the patient the benefits and risks of performing endoscopy.  Benefits and risks were not limited to but including bleeding, infection, perforation, complications of anesthesia, aspiration.  The patient appeared to understand and is willing to proceed.    Thank you for allowing me to participate in the care of this interesting patient.      "

## 2017-11-14 NOTE — PLAN OF CARE
Problem: Patient Care Overview (Adult)  Goal: Adult Individualization and Mutuality  Outcome: Outcome(s) achieved Date Met:  11/14/17

## 2017-11-14 NOTE — PLAN OF CARE
Problem: Patient Care Overview (Adult)  Goal: Plan of Care Review  Outcome: Ongoing (interventions implemented as appropriate)    11/14/17 0902   Coping/Psychosocial Response Interventions   Plan Of Care Reviewed With patient   Patient Care Overview   Progress improving   Outcome Evaluation   Outcome Summary/Follow up Plan denies pain or alittle nausea now

## 2017-11-14 NOTE — ANESTHESIA PREPROCEDURE EVALUATION
Anesthesia Evaluation     Patient summary reviewed and Nursing notes reviewed   no history of anesthetic complications:  NPO Solid Status: > 8 hours  NPO Liquid Status: > 8 hours     Airway   Mallampati: I  TM distance: >3 FB  Neck ROM: full  no difficulty expected  Dental - normal exam     Pulmonary - normal exam    breath sounds clear to auscultation  (+) a smoker (1 ppd) Current Abstained day of surgery,   (-) sleep apnea  Cardiovascular - normal exam  Exercise tolerance: good (4-7 METS)    Rhythm: regular  Rate: normal        Neuro/Psych  (+) headaches (migraines, last 2 weeks ago),    GI/Hepatic/Renal/Endo    (+)  hiatal hernia, GERD, PUD (history of),     Musculoskeletal     Abdominal  - normal exam   Substance History - negative use     OB/GYN      Comment: DUB      Other   (+) arthritis (knees)                                     Anesthesia Plan    ASA 2     MAC     intravenous induction   Anesthetic plan and risks discussed with patient and child.

## 2017-11-14 NOTE — PLAN OF CARE
Problem: Perioperative Period (Adult)  Goal: Signs and Symptoms of Listed Potential Problems Will be Absent or Manageable (Perioperative Period)  Outcome: Outcome(s) achieved Date Met:  11/14/17 11/14/17 0936   Perioperative Period   Problems Assessed (Perioperative Period) all   Problems Present (Perioperative Period) none

## 2017-11-14 NOTE — PLAN OF CARE
Problem: Patient Care Overview (Adult)  Goal: Plan of Care Review  Outcome: Outcome(s) achieved Date Met:  11/14/17

## 2017-11-14 NOTE — OP NOTE
EGD and Colonoscopy Procedure Note      Pre-operative Diagnosis:  Loss of appetite [R63.0]  Nausea [R11.0]  Bloating [R14.0]  LLQ pain [R10.32]  Diarrhea, unspecified type [R19.7]    Post-operative Diagnosis: GERD, gastric polyps, cecum polyps x 2, rectal polyps    Procedure:  EGD with biopsy for H. Pylori with cold forceps and GE junction biopsy with cold forceps and polypectomy with cold forceps and  Colonoscopy with polypectomy with hot snare and cold forceps     Surgeon: Kadeemrenoushana    Anesthetic: MAC     Estimated Blood Loss:  minimal    Complications:  none    Indications:  See preop diagnosis    Findings/Treatments:   GERD, gastric polyps--biopsies and removal with cold forceps  Polyps of colon--removed with hot snare and cold forceps       Scope Withdrawal Time:  > 6 minutes      Recommendations:  Await pathology      Procedure Details     After discussing the benefits and risks of an EGD and Colonoscopy, benefits and risks not limited to but including:  Bleeding, infection, perforation, aspiration; informed consent was signed.  The patient was taken into the endoscopy suite at [facility] and placed in the left lateral decubitus position.  MAC anesthesia was induced under appropriate monitoring.  Bite block was placed and the gastroscope was inserted thru such and advanced under direct vision to second portion of the duodenum.  A careful inspection was made as the gastroscope was withdrawn, including a retroflexed view of the proximal stomach; findings and interventions are described below.  If biopsies were taken, this was done with the cold biopsy forceps.    The bed was then rotated 108 degrees.  A rectal exam was performed.  Sphincter tone was normal.  The colonoscope was then inserted and carefully advanced to the cecum while visualizing the mucosa.  The cecum was identified by the ileocecal valve and the orifice of the appendix.  Once in the cecum, there were two polyps removed with  hot snare.  The scope was then withdrawn while continuing to evaluate the mucosa and deflate air.  There were two rectal polyps which were removed with cold forceps.  The scope was then withdrawn and Ingrid was taken to the recovery area in stable condition having tolerated her procedure well.     Anca Kay DO

## 2017-11-15 ENCOUNTER — HOSPITAL ENCOUNTER (EMERGENCY)
Facility: HOSPITAL | Age: 42
Discharge: HOME | End: 2017-11-15
Attending: EMERGENCY MEDICINE | Admitting: EMERGENCY MEDICINE

## 2017-11-15 ENCOUNTER — APPOINTMENT (OUTPATIENT)
Dept: CT IMAGING | Facility: HOSPITAL | Age: 42
End: 2017-11-15

## 2017-11-15 ENCOUNTER — TELEPHONE (OUTPATIENT)
Dept: SURGERY | Facility: CLINIC | Age: 42
End: 2017-11-15

## 2017-11-15 VITALS
OXYGEN SATURATION: 97 % | RESPIRATION RATE: 16 BRPM | HEIGHT: 67 IN | DIASTOLIC BLOOD PRESSURE: 78 MMHG | BODY MASS INDEX: 26.68 KG/M2 | SYSTOLIC BLOOD PRESSURE: 112 MMHG | WEIGHT: 170 LBS | TEMPERATURE: 98.5 F | HEART RATE: 80 BPM

## 2017-11-15 DIAGNOSIS — R10.30 LOWER ABDOMINAL PAIN: Primary | ICD-10-CM

## 2017-11-15 LAB
ALBUMIN SERPL-MCNC: 3.8 G/DL (ref 3.5–5.2)
ALBUMIN/GLOB SERPL: 1.4 G/DL
ALP SERPL-CCNC: 65 U/L (ref 40–129)
ALT SERPL W P-5'-P-CCNC: 11 U/L (ref 5–33)
ANION GAP SERPL CALCULATED.3IONS-SCNC: 7.3 MMOL/L
AST SERPL-CCNC: 15 U/L (ref 5–32)
BASOPHILS # BLD AUTO: 0.06 10*3/MM3 (ref 0–0.2)
BASOPHILS NFR BLD AUTO: 0.7 % (ref 0–2)
BILIRUB SERPL-MCNC: 0.4 MG/DL (ref 0.2–1.2)
BUN BLD-MCNC: 2 MG/DL (ref 6–20)
BUN/CREAT SERPL: 3.2 (ref 7–25)
CALCIUM SPEC-SCNC: 9.2 MG/DL (ref 8.6–10.5)
CHLORIDE SERPL-SCNC: 103 MMOL/L (ref 98–107)
CO2 SERPL-SCNC: 27.7 MMOL/L (ref 22–29)
CREAT BLD-MCNC: 0.63 MG/DL (ref 0.57–1)
DEPRECATED RDW RBC AUTO: 44.4 FL (ref 37–54)
EOSINOPHIL # BLD AUTO: 0.37 10*3/MM3 (ref 0.1–0.3)
EOSINOPHIL NFR BLD AUTO: 4.5 % (ref 0–4)
ERYTHROCYTE [DISTWIDTH] IN BLOOD BY AUTOMATED COUNT: 12.9 % (ref 11.5–14.5)
GFR SERPL CREATININE-BSD FRML MDRD: 103 ML/MIN/1.73
GLOBULIN UR ELPH-MCNC: 2.8 GM/DL
GLUCOSE BLD-MCNC: 95 MG/DL (ref 65–99)
HCT VFR BLD AUTO: 42.1 % (ref 37–47)
HGB BLD-MCNC: 13.8 G/DL (ref 12–16)
IMM GRANULOCYTES # BLD: 0.01 10*3/MM3 (ref 0–0.03)
IMM GRANULOCYTES NFR BLD: 0.1 % (ref 0–0.5)
LYMPHOCYTES # BLD AUTO: 3.16 10*3/MM3 (ref 0.6–4.8)
LYMPHOCYTES NFR BLD AUTO: 38.2 % (ref 20–45)
MCH RBC QN AUTO: 31 PG (ref 27–31)
MCHC RBC AUTO-ENTMCNC: 32.8 G/DL (ref 31–37)
MCV RBC AUTO: 94.6 FL (ref 81–99)
MONOCYTES # BLD AUTO: 0.52 10*3/MM3 (ref 0–1)
MONOCYTES NFR BLD AUTO: 6.3 % (ref 3–8)
NEUTROPHILS # BLD AUTO: 4.15 10*3/MM3 (ref 1.5–8.3)
NEUTROPHILS NFR BLD AUTO: 50.2 % (ref 45–70)
NRBC BLD MANUAL-RTO: 0 /100 WBC (ref 0–0)
PLATELET # BLD AUTO: 248 10*3/MM3 (ref 140–500)
PMV BLD AUTO: 9.8 FL (ref 7.4–10.4)
POTASSIUM BLD-SCNC: 4.4 MMOL/L (ref 3.5–5.2)
PROT SERPL-MCNC: 6.6 G/DL (ref 6–8.5)
RBC # BLD AUTO: 4.45 10*6/MM3 (ref 4.2–5.4)
SODIUM BLD-SCNC: 138 MMOL/L (ref 136–145)
UREASE TISS QL: NEGATIVE
WBC NRBC COR # BLD: 8.27 10*3/MM3 (ref 4.8–10.8)

## 2017-11-15 PROCEDURE — 99283 EMERGENCY DEPT VISIT LOW MDM: CPT

## 2017-11-15 PROCEDURE — 74177 CT ABD & PELVIS W/CONTRAST: CPT

## 2017-11-15 PROCEDURE — 80053 COMPREHEN METABOLIC PANEL: CPT | Performed by: EMERGENCY MEDICINE

## 2017-11-15 PROCEDURE — 0 DIATRIZOATE MEGLUMINE & SODIUM PER 1 ML: Performed by: EMERGENCY MEDICINE

## 2017-11-15 PROCEDURE — 99282 EMERGENCY DEPT VISIT SF MDM: CPT | Performed by: EMERGENCY MEDICINE

## 2017-11-15 PROCEDURE — 85025 COMPLETE CBC W/AUTO DIFF WBC: CPT | Performed by: EMERGENCY MEDICINE

## 2017-11-15 PROCEDURE — 25010000002 PROMETHAZINE PER 50 MG: Performed by: EMERGENCY MEDICINE

## 2017-11-15 PROCEDURE — 96374 THER/PROPH/DIAG INJ IV PUSH: CPT

## 2017-11-15 PROCEDURE — 25010000002 MORPHINE PER 10 MG: Performed by: EMERGENCY MEDICINE

## 2017-11-15 PROCEDURE — 96375 TX/PRO/DX INJ NEW DRUG ADDON: CPT

## 2017-11-15 PROCEDURE — 0 IOPAMIDOL PER 1 ML: Performed by: EMERGENCY MEDICINE

## 2017-11-15 PROCEDURE — 25010000002 ONDANSETRON PER 1 MG: Performed by: EMERGENCY MEDICINE

## 2017-11-15 RX ORDER — PROMETHAZINE HYDROCHLORIDE 25 MG/ML
6.25 INJECTION, SOLUTION INTRAMUSCULAR; INTRAVENOUS ONCE
Status: COMPLETED | OUTPATIENT
Start: 2017-11-15 | End: 2017-11-15

## 2017-11-15 RX ORDER — ONDANSETRON 2 MG/ML
4 INJECTION INTRAMUSCULAR; INTRAVENOUS ONCE
Status: COMPLETED | OUTPATIENT
Start: 2017-11-15 | End: 2017-11-15

## 2017-11-15 RX ORDER — ONDANSETRON 4 MG/1
4 TABLET, ORALLY DISINTEGRATING ORAL EVERY 6 HOURS PRN
Qty: 20 TABLET | Refills: 0 | Status: SHIPPED | OUTPATIENT
Start: 2017-11-15 | End: 2019-01-11

## 2017-11-15 RX ORDER — SODIUM CHLORIDE 0.9 % (FLUSH) 0.9 %
10 SYRINGE (ML) INJECTION AS NEEDED
Status: DISCONTINUED | OUTPATIENT
Start: 2017-11-15 | End: 2017-11-16 | Stop reason: HOSPADM

## 2017-11-15 RX ORDER — DICYCLOMINE HCL 20 MG
20 TABLET ORAL EVERY 6 HOURS PRN
Qty: 30 TABLET | Refills: 0 | Status: SHIPPED | OUTPATIENT
Start: 2017-11-15 | End: 2019-01-11

## 2017-11-15 RX ORDER — MORPHINE SULFATE 2 MG/ML
2 INJECTION, SOLUTION INTRAMUSCULAR; INTRAVENOUS ONCE
Status: COMPLETED | OUTPATIENT
Start: 2017-11-15 | End: 2017-11-15

## 2017-11-15 RX ADMIN — MORPHINE SULFATE 2 MG: 2 INJECTION, SOLUTION INTRAMUSCULAR; INTRAVENOUS at 20:47

## 2017-11-15 RX ADMIN — MORPHINE SULFATE 4 MG: 4 INJECTION, SOLUTION INTRAMUSCULAR; INTRAVENOUS at 18:48

## 2017-11-15 RX ADMIN — DIATRIZOATE MEGLUMINE AND DIATRIZOATE SODIUM 30 ML: 600; 100 SOLUTION ORAL; RECTAL at 21:30

## 2017-11-15 RX ADMIN — PROMETHAZINE HYDROCHLORIDE 6.25 MG: 25 INJECTION INTRAMUSCULAR; INTRAVENOUS at 20:46

## 2017-11-15 RX ADMIN — IOPAMIDOL 100 ML: 755 INJECTION, SOLUTION INTRAVENOUS at 21:30

## 2017-11-15 RX ADMIN — ONDANSETRON 4 MG: 2 INJECTION, SOLUTION INTRAMUSCULAR; INTRAVENOUS at 18:47

## 2017-11-15 NOTE — ED PROVIDER NOTES
Subjective   Patient is a 42 y.o. female presenting with abdominal pain.   History provided by:  Patient  Abdominal Pain   Pain location:  Generalized  Pain quality: sharp    Pain radiates to:  Does not radiate  Pain severity:  Severe  Duration:  2 weeks  Timing:  Constant  Progression:  Worsening  Chronicity:  Chronic  Context comment:  As described below.  Relieved by:  Nothing  Associated symptoms: diarrhea, nausea and vomiting    Associated symptoms: no belching, no chest pain, no chills, no constipation, no cough, no dysuria, no fever, no hematuria, no shortness of breath and no sore throat      HPI Narrative:Ms. Zelaya   Is a 42-year-old white female who presents secondary to abdominal pain. Patient had onset of abdominal pain 2 weeks ago.  Yesterday she underwent EGD and colonoscopy by Dr. Kay.  She was diagnosed with GERD.  Some polyps were found in her colon.  She was discharged home after these procedures.  She reports her abdominal pain has worsened since these procedures.  She has had several episodes of diarrhea.  She had one set of vomiting today.  Patient called Dr. Kay. She was told to come to the ER for evaluation.    I cared for this patient on 10/5/17.  She was found to have epiploic appendage-itis.  She reports she has had pain consistently since that ER visit.      Review of Systems   Constitutional: Negative.  Negative for appetite change, chills, diaphoresis and fever.   HENT: Negative.  Negative for sore throat.    Eyes: Negative.    Respiratory: Negative for cough, chest tightness, shortness of breath and wheezing.    Cardiovascular: Negative for chest pain, palpitations and leg swelling.   Gastrointestinal: Positive for abdominal pain, diarrhea, nausea and vomiting. Negative for constipation.   Genitourinary: Negative.  Negative for difficulty urinating, dysuria, flank pain, frequency and hematuria.   Musculoskeletal: Negative.    Skin: Negative.  Negative for color change,  pallor and rash.   Neurological: Negative.  Negative for dizziness, seizures, syncope and headaches.   Psychiatric/Behavioral: Negative.  Negative for agitation, behavioral problems and hallucinations.       Past Medical History:   Diagnosis Date   • Anemia    • Diverticulitis    • GERD (gastroesophageal reflux disease)    • Hiatal hernia    • Menstrual abnormality     SCHEDULED FOR SX   • Migraine    • Ulcer of abdomen wall        No Known Allergies    Past Surgical History:   Procedure Laterality Date   • CHOLECYSTECTOMY N/A 9/23/2016    Procedure: CHOLECYSTECTOMY LAPAROSCOPIC;  Surgeon: Cordell Dejesus MD;  Location: [facility] OR;  Service:    • COLONOSCOPY N/A 11/14/2017    Procedure: COLONOSCOPY,   polypectomy ;  Surgeon: Anca Kay DO;  Location: [facility] OR;  Service:    • D&C HYSTEROSCOPY MYOSURE N/A 8/10/2016    Procedure: DILATATION AND CURETTAGE HYSTEROSCOPY ;  Surgeon: Frank Alvarado MD;  Location: [facility];  Service:    • DIAGNOSTIC LAPAROSCOPY     • ECTOPIC PREGNANCY SURGERY     • ENDOSCOPY N/A 11/14/2017    Procedure: ESOPHAGOGASTRODUODENOSCOPY  with gastric biopsy for  SHERI TEST, POLYPECTOMY ;  Surgeon: Anca Kay DO;  Location: [facility] OR;  Service:    • FOOT SURGERY Right     x3   • HYSTERECTOMY     • CA LAP, RADICAL HYST W/ TUBE&OV, NODE BX N/A 11/28/2016    Procedure: TOTAL LAPAROSCOPIC HYSTERECTOMY, bilateral salpingectomy, ablation of peritoneal endometriosis;  Surgeon: Frank Alvarado MD;  Location: [facility];  Service: Obstetrics/Gynecology   • TUBAL ABDOMINAL LIGATION         Family History   Problem Relation Age of Onset   • Hypertension Mother    • Cancer Father    • Hypertension Father        Social History     Social History   • Marital status: Single     Spouse name: N/A   • Number of children: N/A   • Years of education: N/A     Social History Main Topics   • Smoking status: Current Every Day Smoker     Packs/day: 1.00     Last attempt to quit:  7/17/2016   • Smokeless tobacco: Never Used   • Alcohol use No   • Drug use: No   • Sexual activity: Defer     Other Topics Concern   • None     Social History Narrative           Objective   Physical Exam   Constitutional: She is oriented to person, place, and time. She appears well-developed and well-nourished. No distress.   42-year-old white female laying in bed.  Patient appears in good overall health.  She appears uncomfortable but not in significant distress.  Patient is known to me from prior ER visit.  She is accompanied by her daughter.   HENT:   Head: Normocephalic and atraumatic.   Right Ear: External ear normal.   Left Ear: External ear normal.   Nose: Nose normal.   Mouth/Throat: Oropharynx is clear and moist.   Eyes: Conjunctivae and EOM are normal. Pupils are equal, round, and reactive to light.   Neck: Normal range of motion. Neck supple.   Cardiovascular: Normal rate, regular rhythm, normal heart sounds and intact distal pulses.  Exam reveals no gallop and no friction rub.    No murmur heard.  Pulmonary/Chest: Effort normal and breath sounds normal.   Abdominal: Soft. Bowel sounds are normal. She exhibits no distension. There is no hepatosplenomegaly. There is tenderness in the left upper quadrant and left lower quadrant. There is no rigidity, no rebound, no guarding, no CVA tenderness, no tenderness at McBurney's point and negative Nuno's sign. No hernia.   Musculoskeletal: Normal range of motion.   Neurological: She is alert and oriented to person, place, and time.   Skin: Skin is warm and dry. She is not diaphoretic.   Psychiatric: She has a normal mood and affect. Her behavior is normal.   Nursing note and vitals reviewed.      Procedures         ED Course  ED Course   Comment By Time   11/15/17  6:02 PM  Patient was diagnosed with epipoloic appendagitis in this ER by me several weeks ago.  She underwent EGD and colonoscopy yesterday.  She was found to have reflux as well as colon polyps.   However patient reports her abdominal pain is worse today.  Will obtain lab work and CT.  Giving pain and nausea medications. Tor Clarke MD 11/15 8009   11/15/17  10:06 PM  CT is unremarkable.  Only notable for gaseous distention of the colon.  I feel this is residual from patient's colonoscopy yesterday and likely source of patient's pain.  Discussed at length with patient results of lab work, CT, diagnoses, treatment and follow-up.  Will DC home. Tor Clarke MD 11/15 2207      Labs Reviewed   COMPREHENSIVE METABOLIC PANEL - Abnormal; Notable for the following:        Result Value    BUN 2 (*)     BUN/Creatinine Ratio 3.2 (*)     All other components within normal limits   CBC WITH AUTO DIFFERENTIAL - Abnormal; Notable for the following:     Eosinophil % 4.5 (*)     Eosinophils, Absolute 0.37 (*)     All other components within normal limits   CBC AND DIFFERENTIAL    Narrative:     The following orders were created for panel order CBC & Differential.  Procedure                               Abnormality         Status                     ---------                               -----------         ------                     CBC Auto Differential[[id number]]        Abnormal            Final result                 Please view results for these tests on the individual orders.     My differential diagnosis for abdominal pain includes but is not limited to:  Gastritis, gastroenteritis, peptic ulcer disease, GERD, esophageal perforation, acute appendicitis, mesenteric adenitis, Meckel’s diverticulum, epiploic appendagitis, diverticulitis, colon cancer, ulcerative colitis, Crohn’s disease, intussusception, small bowel obstruction, adhesions, ischemic bowel, perforated viscus, ileus, obstipation, biliary colic, cholecystitis, cholelithiasis, Von-Roberto Abner, hepatitis, pancreatitis, common bile duct obstruction, cholangitis, bile leak, splenic trauma, splenic rupture, splenic infarction, splenic abscess,  abdominal abscess, ascites, spontaneous bacterial peritonitis, hernia, UTI, cystitis, prostatitis, ureterolithiasis, urinary obstruction, ovarian cyst, torsion, pregnancy, ectopic pregnancy, PID, pelvic abscess, mittelschmerz, endometriosis, AAA, myocardial infarction, pneumonia, cancer, porphyria, DKA, medications, sickle cell, viral syndrome, zoster            MDM  Number of Diagnoses or Management Options  Lower abdominal pain: new and requires workup     Amount and/or Complexity of Data Reviewed  Clinical lab tests: reviewed and ordered  Tests in the radiology section of CPT®: reviewed and ordered  Independent visualization of images, tracings, or specimens: yes    Risk of Complications, Morbidity, and/or Mortality  Presenting problems: moderate  Diagnostic procedures: high  Management options: low    Patient Progress  Patient progress: improved      Final diagnoses:   Lower abdominal pain            Tor Clarke MD  11/15/17 [id number]

## 2017-11-15 NOTE — TELEPHONE ENCOUNTER
Patient daughter, Cyndi, has called the office on behalf of her mom, Ingrid.  She states that her mom has had left side pain that started yesterday after the EGD & C-Scope, it's a constant stabbing pain, and she started today with dizziness.    After speaking with Dr. Kay about this, I contacted the daughter and Dr. Kay had me advise the patient to go to the ER.  The daughter was notified and understood, said she would tell her mom.

## 2017-11-16 LAB
LAB AP CASE REPORT: NORMAL
LAB AP CLINICAL INFORMATION: NORMAL
Lab: NORMAL

## 2019-01-11 ENCOUNTER — HOSPITAL ENCOUNTER (EMERGENCY)
Facility: HOSPITAL | Age: 44
Discharge: HOME | End: 2019-01-11
Attending: EMERGENCY MEDICINE | Admitting: EMERGENCY MEDICINE

## 2019-01-11 VITALS
BODY MASS INDEX: 28.61 KG/M2 | HEART RATE: 92 BPM | OXYGEN SATURATION: 100 % | DIASTOLIC BLOOD PRESSURE: 84 MMHG | SYSTOLIC BLOOD PRESSURE: 127 MMHG | WEIGHT: 178 LBS | TEMPERATURE: 97.8 F | RESPIRATION RATE: 12 BRPM | HEIGHT: 66 IN

## 2019-01-11 DIAGNOSIS — M54.12 CERVICAL RADICULOPATHY: Primary | ICD-10-CM

## 2019-01-11 DIAGNOSIS — L30.9 ECZEMA, UNSPECIFIED TYPE: ICD-10-CM

## 2019-01-11 PROCEDURE — 96372 THER/PROPH/DIAG INJ SC/IM: CPT

## 2019-01-11 PROCEDURE — 25010000002 KETOROLAC TROMETHAMINE PER 15 MG: Performed by: EMERGENCY MEDICINE

## 2019-01-11 PROCEDURE — 99282 EMERGENCY DEPT VISIT SF MDM: CPT | Performed by: EMERGENCY MEDICINE

## 2019-01-11 PROCEDURE — 99283 EMERGENCY DEPT VISIT LOW MDM: CPT

## 2019-01-11 RX ORDER — METHYLPREDNISOLONE 4 MG/1
TABLET ORAL
Qty: 21 TABLET | Refills: 0 | Status: SHIPPED | OUTPATIENT
Start: 2019-01-11 | End: 2019-02-25

## 2019-01-11 RX ORDER — METHOCARBAMOL 500 MG/1
500 TABLET, FILM COATED ORAL 2 TIMES DAILY PRN
Qty: 10 TABLET | Refills: 0 | Status: SHIPPED | OUTPATIENT
Start: 2019-01-11 | End: 2019-01-16

## 2019-01-11 RX ORDER — OMEPRAZOLE 20 MG/1
40 CAPSULE, DELAYED RELEASE ORAL DAILY
COMMUNITY

## 2019-01-11 RX ORDER — KETOROLAC TROMETHAMINE 30 MG/ML
30 INJECTION, SOLUTION INTRAMUSCULAR; INTRAVENOUS ONCE
Status: COMPLETED | OUTPATIENT
Start: 2019-01-11 | End: 2019-01-11

## 2019-01-11 RX ADMIN — KETOROLAC TROMETHAMINE 30 MG: 30 INJECTION INTRAMUSCULAR; INTRAVENOUS at 18:33

## 2019-01-11 NOTE — DISCHARGE INSTRUCTIONS
Rest as needed.  Apply heating pad to the neck and back area.  Please return to the emergency room for any worsening pain, swelling, weakness, fevers or any other concerns.  Follow-up with your doctor next week for further evaluation and testing as needed.

## 2019-01-12 NOTE — ED PROVIDER NOTES
Subjective   History of Present Illness  History of Present Illness    Chief complaint: Neck pain, rash    Location: Right side of neck radiating into the right shoulder and back and down the right arm    Quality/Severity:  Moderate, sharp pain    Timing/Duration: Worsening today     Modifying Factors: Worse with movement of the right arm in any direction    Narrative: This patient presents for evaluation of worsening right-sided neck pain that radiates from the back of the neck down to the shoulder, right upper back, and down the length of the right arm.  She says it hurts to move the right arm in any direction.  She is right-hand dominant.  She works at a local [employer].  She denies any recent injuries or straining patterns.  She denies any fevers.  She denies any weakness.  She tried taking some over-the-counter medications but that has not helped her pain.  Additionally she reports a rash along the neck and behind her ears bilaterally.  This rash has been present for a couple of weeks now.  She has been using some hydrocortisone ointment and other lotions but the rash has remained.    Associated Symptoms: As above    Review of Systems   Constitutional: Negative for activity change and fever.   Respiratory: Negative for cough and shortness of breath.    Cardiovascular: Negative for chest pain.   Gastrointestinal: Negative for abdominal pain.   Musculoskeletal: Positive for arthralgias, back pain, myalgias and neck pain.   Skin: Positive for rash. Negative for color change.   Neurological: Negative for syncope and weakness.   All other systems reviewed and are negative.      Past Medical History:   Diagnosis Date   • Anemia    • Diverticulitis    • GERD (gastroesophageal reflux disease)    • Hiatal hernia    • Menstrual abnormality     SCHEDULED FOR SX   • Migraine    • Ulcer of abdomen wall (CMS/HCC)        No Known Allergies    Past Surgical History:   Procedure Laterality Date   • CHOLECYSTECTOMY N/A  2016    Procedure: CHOLECYSTECTOMY LAPAROSCOPIC;  Surgeon: Cordell Dejesus MD;  Location: [facility] OR;  Service:    • COLONOSCOPY N/A 2017    Procedure: COLONOSCOPY,   polypectomy ;  Surgeon: Anca Kay DO;  Location: [facility] OR;  Service:    • D&C HYSTEROSCOPY MYOSURE N/A 8/10/2016    Procedure: DILATATION AND CURETTAGE HYSTEROSCOPY ;  Surgeon: Frank Alvarado MD;  Location: [facility] OR;  Service:    • DIAGNOSTIC LAPAROSCOPY     • ECTOPIC PREGNANCY SURGERY     • ENDOSCOPY N/A 2017    Procedure: ESOPHAGOGASTRODUODENOSCOPY  with gastric biopsy for  SHERI TEST, POLYPECTOMY ;  Surgeon: Anca Kay DO;  Location: [facility] OR;  Service:    • FOOT SURGERY Right     x3   • HYSTERECTOMY     • VT LAP, RADICAL HYST W/ TUBE&OV, NODE BX N/A 2016    Procedure: TOTAL LAPAROSCOPIC HYSTERECTOMY, bilateral salpingectomy, ablation of peritoneal endometriosis;  Surgeon: Frank Alvarado MD;  Location: [facility];  Service: Obstetrics/Gynecology   • TUBAL ABDOMINAL LIGATION         Family History   Problem Relation Age of Onset   • Hypertension Mother    • Cancer Father    • Hypertension Father        Social History     Socioeconomic History   • Marital status: Single     Spouse name: Not on file   • Number of children: Not on file   • Years of education: Not on file   • Highest education level: Not on file   Tobacco Use   • Smoking status: Current Every Day Smoker     Packs/day: 1.00     Last attempt to quit: 2016     Years since quittin.4   • Smokeless tobacco: Never Used   Substance and Sexual Activity   • Alcohol use: No   • Drug use: No   • Sexual activity: Defer       ED Triage Vitals   Temp Heart Rate Resp BP SpO2   19 1722 19 1722 19 17219 17219   97.9 °F (36.6 °C) 100 16 142/86 99 %      Temp src Heart Rate Source Patient Position BP Location FiO2 (%)   19 1722 19 1722 19 --   Oral Monitor Lying  Left arm          Objective   Physical Exam   Constitutional: She is oriented to person, place, and time. She appears well-developed and well-nourished. No distress.   HENT:   Head: Normocephalic and atraumatic.   Eyes: EOM are normal. Pupils are equal, round, and reactive to light. Right eye exhibits no discharge. Left eye exhibits no discharge.   Neck: Normal range of motion. Neck supple.   Cardiovascular: Normal rate, regular rhythm and intact distal pulses.   Pulmonary/Chest: Effort normal. No respiratory distress.   Musculoskeletal: Normal range of motion. She exhibits tenderness. She exhibits no edema or deformity.   Moderate diffuse tenderness along the right paravertebral soft tissues of the posterior neck and also tenderness in the right suprascapular back soft tissues.  Patient holds the right arm in a abducted and internally rotated position for comfort.  Neurovascularly intact with normal sensation to all 5 fingers and also normal wrist and thumb extension as well as normal  strength to both hands.   Neurological: She is alert and oriented to person, place, and time. No sensory deficit. She exhibits normal muscle tone. Coordination normal.   Skin: Skin is warm and dry. Rash noted. She is not diaphoretic. No erythema.   Mild eczematous changes notable in the flexural folds of the neck and behind the ears.  No drainage.  Nontender.   Psychiatric: She has a normal mood and affect. Her behavior is normal. Judgment and thought content normal.   Nursing note and vitals reviewed.      Procedures           ED Course  ED Course as of Jan 12 0054   Sat Jan 12, 2019   0054 Patient presented with what appears to be an acute cervical radiculopathy.  No indication for imaging at this time.  We'll start her on a short course of steroids and muscle relaxers.  Advised rest.  Provided a sling for immobilization of the right arm.  Discharged home in good condition with instructions to follow-up at her primary care  doctor's office for further testing if her symptoms do not improve.  [YINA]      ED Course User Index  [YINA] Lennox Moncada MD                  [facility]      Final diagnoses:   Cervical radiculopathy   Eczema, unspecified type            Lennox Moncada MD  01/12/19 0055

## 2019-02-25 ENCOUNTER — APPOINTMENT (OUTPATIENT)
Dept: CT IMAGING | Facility: HOSPITAL | Age: 44
End: 2019-02-25

## 2019-02-25 ENCOUNTER — HOSPITAL ENCOUNTER (EMERGENCY)
Facility: HOSPITAL | Age: 44
Discharge: HOME | End: 2019-02-25
Attending: EMERGENCY MEDICINE | Admitting: EMERGENCY MEDICINE

## 2019-02-25 VITALS
DIASTOLIC BLOOD PRESSURE: 89 MMHG | RESPIRATION RATE: 14 BRPM | TEMPERATURE: 98.4 F | SYSTOLIC BLOOD PRESSURE: 127 MMHG | WEIGHT: 174 LBS | HEART RATE: 82 BPM | HEIGHT: 66 IN | BODY MASS INDEX: 27.97 KG/M2 | OXYGEN SATURATION: 98 %

## 2019-02-25 DIAGNOSIS — R10.9 ACUTE LEFT FLANK PAIN: Primary | ICD-10-CM

## 2019-02-25 DIAGNOSIS — N39.0 ACUTE UTI: ICD-10-CM

## 2019-02-25 LAB
ALBUMIN SERPL-MCNC: 3.7 G/DL (ref 3.5–5.2)
ALBUMIN/GLOB SERPL: 1.4 G/DL
ALP SERPL-CCNC: 55 U/L (ref 40–129)
ALT SERPL W P-5'-P-CCNC: 16 U/L (ref 5–33)
ANION GAP SERPL CALCULATED.3IONS-SCNC: 10 MMOL/L
AST SERPL-CCNC: 16 U/L (ref 5–32)
BACTERIA UR QL AUTO: ABNORMAL /HPF
BASOPHILS # BLD AUTO: 0.05 10*3/MM3 (ref 0–0.2)
BASOPHILS NFR BLD AUTO: 0.7 % (ref 0–1.5)
BILIRUB SERPL-MCNC: 0.2 MG/DL (ref 0.2–1.2)
BILIRUB UR QL STRIP: NEGATIVE
BUN BLD-MCNC: 3 MG/DL (ref 6–20)
BUN/CREAT SERPL: 4.1 (ref 7–25)
CALCIUM SPEC-SCNC: 8.6 MG/DL (ref 8.6–10.5)
CHLORIDE SERPL-SCNC: 105 MMOL/L (ref 98–107)
CLARITY UR: CLEAR
CO2 SERPL-SCNC: 23 MMOL/L (ref 22–29)
COLOR UR: ABNORMAL
CREAT BLD-MCNC: 0.73 MG/DL (ref 0.57–1)
DEPRECATED RDW RBC AUTO: 43.7 FL (ref 37–54)
EOSINOPHIL # BLD AUTO: 0.53 10*3/MM3 (ref 0–0.4)
EOSINOPHIL NFR BLD AUTO: 7.9 % (ref 0.3–6.2)
ERYTHROCYTE [DISTWIDTH] IN BLOOD BY AUTOMATED COUNT: 12.3 % (ref 12.3–15.4)
GFR SERPL CREATININE-BSD FRML MDRD: 86 ML/MIN/1.73
GLOBULIN UR ELPH-MCNC: 2.7 GM/DL
GLUCOSE BLD-MCNC: 113 MG/DL (ref 65–99)
GLUCOSE UR STRIP-MCNC: ABNORMAL MG/DL
HCT VFR BLD AUTO: 40.7 % (ref 34–46.6)
HGB BLD-MCNC: 13.3 G/DL (ref 12–15.9)
HGB UR QL STRIP.AUTO: NEGATIVE
HYALINE CASTS UR QL AUTO: ABNORMAL /LPF
IMM GRANULOCYTES # BLD AUTO: 0.01 10*3/MM3 (ref 0–0.05)
IMM GRANULOCYTES NFR BLD AUTO: 0.1 % (ref 0–0.5)
KETONES UR QL STRIP: NEGATIVE
LEUKOCYTE ESTERASE UR QL STRIP.AUTO: ABNORMAL
LIPASE SERPL-CCNC: 20 U/L (ref 13–60)
LYMPHOCYTES # BLD AUTO: 2.11 10*3/MM3 (ref 0.7–3.1)
LYMPHOCYTES NFR BLD AUTO: 31.3 % (ref 19.6–45.3)
MCH RBC QN AUTO: 31.4 PG (ref 26.6–33)
MCHC RBC AUTO-ENTMCNC: 32.7 G/DL (ref 31.5–35.7)
MCV RBC AUTO: 96 FL (ref 79–97)
MONOCYTES # BLD AUTO: 0.44 10*3/MM3 (ref 0.1–0.9)
MONOCYTES NFR BLD AUTO: 6.5 % (ref 5–12)
NEUTROPHILS # BLD AUTO: 3.6 10*3/MM3 (ref 1.4–7)
NEUTROPHILS NFR BLD AUTO: 53.5 % (ref 42.7–76)
NITRITE UR QL STRIP: POSITIVE
PH UR STRIP.AUTO: 7 [PH] (ref 4.5–8)
PLATELET # BLD AUTO: 263 10*3/MM3 (ref 140–450)
PMV BLD AUTO: 9.9 FL (ref 6–12)
POTASSIUM BLD-SCNC: 3.5 MMOL/L (ref 3.5–5.2)
PROT SERPL-MCNC: 6.4 G/DL (ref 6–8.5)
PROT UR QL STRIP: NEGATIVE
RBC # BLD AUTO: 4.24 10*6/MM3 (ref 3.77–5.28)
RBC # UR: ABNORMAL /HPF
REF LAB TEST METHOD: ABNORMAL
SODIUM BLD-SCNC: 138 MMOL/L (ref 136–145)
SP GR UR STRIP: 1.02 (ref 1–1.03)
SQUAMOUS #/AREA URNS HPF: ABNORMAL /HPF
UROBILINOGEN UR QL STRIP: ABNORMAL
WBC NRBC COR # BLD: 6.74 10*3/MM3 (ref 3.4–10.8)
WBC UR QL AUTO: ABNORMAL /HPF

## 2019-02-25 PROCEDURE — 96361 HYDRATE IV INFUSION ADD-ON: CPT

## 2019-02-25 PROCEDURE — 25010000002 HYDROMORPHONE PER 4 MG: Performed by: EMERGENCY MEDICINE

## 2019-02-25 PROCEDURE — 74176 CT ABD & PELVIS W/O CONTRAST: CPT

## 2019-02-25 PROCEDURE — 96374 THER/PROPH/DIAG INJ IV PUSH: CPT

## 2019-02-25 PROCEDURE — 99284 EMERGENCY DEPT VISIT MOD MDM: CPT | Performed by: EMERGENCY MEDICINE

## 2019-02-25 PROCEDURE — 83690 ASSAY OF LIPASE: CPT | Performed by: EMERGENCY MEDICINE

## 2019-02-25 PROCEDURE — 25010000002 ONDANSETRON PER 1 MG: Performed by: EMERGENCY MEDICINE

## 2019-02-25 PROCEDURE — 85025 COMPLETE CBC W/AUTO DIFF WBC: CPT | Performed by: EMERGENCY MEDICINE

## 2019-02-25 PROCEDURE — 80053 COMPREHEN METABOLIC PANEL: CPT | Performed by: EMERGENCY MEDICINE

## 2019-02-25 PROCEDURE — 99284 EMERGENCY DEPT VISIT MOD MDM: CPT

## 2019-02-25 PROCEDURE — 96375 TX/PRO/DX INJ NEW DRUG ADDON: CPT

## 2019-02-25 PROCEDURE — 87086 URINE CULTURE/COLONY COUNT: CPT | Performed by: EMERGENCY MEDICINE

## 2019-02-25 PROCEDURE — 81001 URINALYSIS AUTO W/SCOPE: CPT | Performed by: EMERGENCY MEDICINE

## 2019-02-25 RX ORDER — ONDANSETRON 4 MG/1
TABLET, ORALLY DISINTEGRATING ORAL
Qty: 20 TABLET | Refills: 0 | Status: SHIPPED | OUTPATIENT
Start: 2019-02-25 | End: 2020-04-28

## 2019-02-25 RX ORDER — METHOCARBAMOL 500 MG/1
500 TABLET, FILM COATED ORAL 4 TIMES DAILY PRN
Qty: 15 TABLET | Refills: 0 | Status: SHIPPED | OUTPATIENT
Start: 2019-02-25 | End: 2020-04-28

## 2019-02-25 RX ORDER — SODIUM CHLORIDE 0.9 % (FLUSH) 0.9 %
10 SYRINGE (ML) INJECTION AS NEEDED
Status: DISCONTINUED | OUTPATIENT
Start: 2019-02-25 | End: 2019-02-25 | Stop reason: HOSPADM

## 2019-02-25 RX ORDER — HYDROMORPHONE HCL 110MG/55ML
1 PATIENT CONTROLLED ANALGESIA SYRINGE INTRAVENOUS ONCE
Status: COMPLETED | OUTPATIENT
Start: 2019-02-25 | End: 2019-02-25

## 2019-02-25 RX ORDER — ONDANSETRON 2 MG/ML
4 INJECTION INTRAMUSCULAR; INTRAVENOUS ONCE
Status: COMPLETED | OUTPATIENT
Start: 2019-02-25 | End: 2019-02-25

## 2019-02-25 RX ORDER — CIPROFLOXACIN 500 MG/1
500 TABLET, FILM COATED ORAL 2 TIMES DAILY
COMMUNITY
End: 2020-04-28

## 2019-02-25 RX ORDER — CEFDINIR 300 MG/1
300 CAPSULE ORAL 2 TIMES DAILY
COMMUNITY
End: 2020-04-28

## 2019-02-25 RX ADMIN — SODIUM CHLORIDE 1000 ML: 9 INJECTION, SOLUTION INTRAVENOUS at 10:56

## 2019-02-25 RX ADMIN — ONDANSETRON 4 MG: 2 INJECTION, SOLUTION INTRAMUSCULAR; INTRAVENOUS at 10:57

## 2019-02-25 RX ADMIN — HYDROMORPHONE HYDROCHLORIDE 1 MG: 2 INJECTION, SOLUTION INTRAMUSCULAR; INTRAVENOUS; SUBCUTANEOUS at 10:59

## 2019-02-27 LAB — BACTERIA SPEC AEROBE CULT: NO GROWTH

## 2019-09-23 NOTE — DISCHARGE INSTRUCTIONS
Medications directed.  Call Dr. Kay in the morning to inform her of ER visit.  Follow-up with Dr. Kay as directed.  Return to ED for medical emergencies.    Hypertension  Hypertension, commonly called high blood pressure, is when the force of blood pumping through your arteries is too strong. Your arteries are the blood vessels that carry blood from your heart throughout your body. A blood pressure reading consists of a higher number over a lower number, such as 110/72. The higher number (systolic) is the pressure inside your arteries when your heart pumps. The lower number (diastolic) is the pressure inside your arteries when your heart relaxes. Ideally you want your blood pressure below 120/80.  Hypertension forces your heart to work harder to pump blood. Your arteries may become narrow or stiff. Having untreated or uncontrolled hypertension can cause heart attack, stroke, kidney disease, and other problems.  RISK FACTORS  Some risk factors for high blood pressure are controllable. Others are not.   Risk factors you cannot control include:   · Race. You may be at higher risk if you are .  · Age. Risk increases with age.  · Gender. Men are at higher risk than women before age 45 years. After age 65, women are at higher risk than men.  Risk factors you can control include:  · Not getting enough exercise or physical activity.  · Being overweight.  · Getting too much fat, sugar, calories, or salt in your diet.  · Drinking too much alcohol.  SIGNS AND SYMPTOMS  Hypertension does not usually cause signs or symptoms. Extremely high blood pressure (hypertensive crisis) may cause headache, anxiety, shortness of breath, and nosebleed.  DIAGNOSIS  To check if you have hypertension, your health care provider will measure your blood pressure while you are seated, with your arm held at the level of your heart. It should be measured at least twice using the same arm. Certain conditions can cause a  What Type Of Note Output Would You Prefer (Optional)?: Standard Output How Severe Are Your Spot(S)?: mild difference in blood pressure between your right and left arms. A blood pressure reading that is higher than normal on one occasion does not mean that you need treatment. If it is not clear whether you have high blood pressure, you may be asked to return on a different day to have your blood pressure checked again. Or, you may be asked to monitor your blood pressure at home for 1 or more weeks.  TREATMENT  Treating high blood pressure includes making lifestyle changes and possibly taking medicine. Living a healthy lifestyle can help lower high blood pressure. You may need to change some of your habits.  Lifestyle changes may include:  · Following the DASH diet. This diet is high in fruits, vegetables, and whole grains. It is low in salt, red meat, and added sugars.  · Keep your sodium intake below 2,300 mg per day.  · Getting at least 30-45 minutes of aerobic exercise at least 4 times per week.  · Losing weight if necessary.  · Not smoking.  · Limiting alcoholic beverages.  · Learning ways to reduce stress.  Your health care provider may prescribe medicine if lifestyle changes are not enough to get your blood pressure under control, and if one of the following is true:  · You are 18-59 years of age and your systolic blood pressure is above 140.  · You are 60 years of age or older, and your systolic blood pressure is above 150.  · Your diastolic blood pressure is above 90.  · You have diabetes, and your systolic blood pressure is over 140 or your diastolic blood pressure is over 90.  · You have kidney disease and your blood pressure is above 140/90.  · You have heart disease and your blood pressure is above 140/90.  Your personal target blood pressure may vary depending on your medical conditions, your age, and other factors.  HOME CARE INSTRUCTIONS  · Have your blood pressure rechecked as directed by your health care provider.    · Take medicines only as directed by your health care provider. Follow the directions  Have Your Spot(S) Been Treated In The Past?: has not been treated carefully. Blood pressure medicines must be taken as prescribed. The medicine does not work as well when you skip doses. Skipping doses also puts you at risk for problems.  · Do not smoke.    · Monitor your blood pressure at home as directed by your health care provider.   SEEK MEDICAL CARE IF:   · You think you are having a reaction to medicines taken.  · You have recurrent headaches or feel dizzy.  · You have swelling in your ankles.  · You have trouble with your vision.  SEEK IMMEDIATE MEDICAL CARE IF:  · You develop a severe headache or confusion.  · You have unusual weakness, numbness, or feel faint.  · You have severe chest or abdominal pain.  · You vomit repeatedly.  · You have trouble breathing.  MAKE SURE YOU:   · Understand these instructions.  · Will watch your condition.  · Will get help right away if you are not doing well or get worse.     This information is not intended to replace advice given to you by your health care provider. Make sure you discuss any questions you have with your health care provider.     Document Released: 12/18/2006 Document Revised: 05/03/2016 Document Reviewed: 10/10/2014  [employer] Interactive Patient Education ©2017 [employer]     Hpi Title: Evaluation of Skin Lesions

## 2020-04-28 ENCOUNTER — HOSPITAL ENCOUNTER (EMERGENCY)
Facility: HOSPITAL | Age: 45
Discharge: HOME | End: 2020-04-28
Attending: EMERGENCY MEDICINE | Admitting: EMERGENCY MEDICINE

## 2020-04-28 VITALS
DIASTOLIC BLOOD PRESSURE: 72 MMHG | WEIGHT: 144 LBS | HEART RATE: 90 BPM | RESPIRATION RATE: 16 BRPM | HEIGHT: 66 IN | OXYGEN SATURATION: 99 % | SYSTOLIC BLOOD PRESSURE: 116 MMHG | BODY MASS INDEX: 23.14 KG/M2 | TEMPERATURE: 98.5 F

## 2020-04-28 DIAGNOSIS — G43.809 OTHER MIGRAINE WITHOUT STATUS MIGRAINOSUS, NOT INTRACTABLE: Primary | ICD-10-CM

## 2020-04-28 LAB
ALBUMIN SERPL-MCNC: 4 G/DL (ref 3.5–5.2)
ALBUMIN/GLOB SERPL: 1.4 G/DL
ALP SERPL-CCNC: 69 U/L (ref 39–117)
ALT SERPL W P-5'-P-CCNC: 10 U/L (ref 1–33)
ANION GAP SERPL CALCULATED.3IONS-SCNC: 9.7 MMOL/L (ref 5–15)
AST SERPL-CCNC: 9 U/L (ref 1–32)
BASOPHILS # BLD AUTO: 0.03 10*3/MM3 (ref 0–0.2)
BASOPHILS NFR BLD AUTO: 0.7 % (ref 0–1.5)
BILIRUB SERPL-MCNC: 0.3 MG/DL (ref 0.2–1.2)
BUN BLD-MCNC: 6 MG/DL (ref 6–20)
BUN/CREAT SERPL: 8.5 (ref 7–25)
CALCIUM SPEC-SCNC: 9.5 MG/DL (ref 8.6–10.5)
CHLORIDE SERPL-SCNC: 101 MMOL/L (ref 98–107)
CO2 SERPL-SCNC: 26.3 MMOL/L (ref 22–29)
CREAT BLD-MCNC: 0.71 MG/DL (ref 0.57–1)
DEPRECATED RDW RBC AUTO: 44.9 FL (ref 37–54)
EOSINOPHIL # BLD AUTO: 0.12 10*3/MM3 (ref 0–0.4)
EOSINOPHIL NFR BLD AUTO: 2.7 % (ref 0.3–6.2)
ERYTHROCYTE [DISTWIDTH] IN BLOOD BY AUTOMATED COUNT: 12.4 % (ref 12.3–15.4)
FLUAV AG NPH QL: NEGATIVE
FLUBV AG NPH QL IA: NEGATIVE
GFR SERPL CREATININE-BSD FRML MDRD: 89 ML/MIN/1.73
GLOBULIN UR ELPH-MCNC: 2.8 GM/DL
GLUCOSE BLD-MCNC: 88 MG/DL (ref 65–99)
HCT VFR BLD AUTO: 44.8 % (ref 34–46.6)
HGB BLD-MCNC: 14.3 G/DL (ref 12–15.9)
IMM GRANULOCYTES # BLD AUTO: 0.01 10*3/MM3 (ref 0–0.05)
IMM GRANULOCYTES NFR BLD AUTO: 0.2 % (ref 0–0.5)
LYMPHOCYTES # BLD AUTO: 1.07 10*3/MM3 (ref 0.7–3.1)
LYMPHOCYTES NFR BLD AUTO: 24.2 % (ref 19.6–45.3)
MCH RBC QN AUTO: 31.3 PG (ref 26.6–33)
MCHC RBC AUTO-ENTMCNC: 31.9 G/DL (ref 31.5–35.7)
MCV RBC AUTO: 98 FL (ref 79–97)
MONOCYTES # BLD AUTO: 0.46 10*3/MM3 (ref 0.1–0.9)
MONOCYTES NFR BLD AUTO: 10.4 % (ref 5–12)
NEUTROPHILS # BLD AUTO: 2.74 10*3/MM3 (ref 1.7–7)
NEUTROPHILS NFR BLD AUTO: 61.8 % (ref 42.7–76)
NRBC BLD AUTO-RTO: 0 /100 WBC (ref 0–0.2)
PLATELET # BLD AUTO: 228 10*3/MM3 (ref 140–450)
PMV BLD AUTO: 9.3 FL (ref 6–12)
POTASSIUM BLD-SCNC: 3.8 MMOL/L (ref 3.5–5.2)
PROT SERPL-MCNC: 6.8 G/DL (ref 6–8.5)
RBC # BLD AUTO: 4.57 10*6/MM3 (ref 3.77–5.28)
SARS-COV-2 RNA RESP QL NAA+PROBE: NOT DETECTED
SODIUM BLD-SCNC: 137 MMOL/L (ref 136–145)
WBC NRBC COR # BLD: 4.43 10*3/MM3 (ref 3.4–10.8)

## 2020-04-28 PROCEDURE — 85025 COMPLETE CBC W/AUTO DIFF WBC: CPT | Performed by: EMERGENCY MEDICINE

## 2020-04-28 PROCEDURE — 87804 INFLUENZA ASSAY W/OPTIC: CPT | Performed by: EMERGENCY MEDICINE

## 2020-04-28 PROCEDURE — 99284 EMERGENCY DEPT VISIT MOD MDM: CPT | Performed by: EMERGENCY MEDICINE

## 2020-04-28 PROCEDURE — 87635 SARS-COV-2 COVID-19 AMP PRB: CPT | Performed by: EMERGENCY MEDICINE

## 2020-04-28 PROCEDURE — 25010000002 DEXAMETHASONE PER 1 MG

## 2020-04-28 PROCEDURE — 25010000002 DIPHENHYDRAMINE PER 50 MG: Performed by: EMERGENCY MEDICINE

## 2020-04-28 PROCEDURE — 80053 COMPREHEN METABOLIC PANEL: CPT | Performed by: EMERGENCY MEDICINE

## 2020-04-28 PROCEDURE — 96375 TX/PRO/DX INJ NEW DRUG ADDON: CPT

## 2020-04-28 PROCEDURE — 25010000002 HYDROMORPHONE PER 4 MG: Performed by: EMERGENCY MEDICINE

## 2020-04-28 PROCEDURE — 99284 EMERGENCY DEPT VISIT MOD MDM: CPT

## 2020-04-28 PROCEDURE — 96374 THER/PROPH/DIAG INJ IV PUSH: CPT

## 2020-04-28 PROCEDURE — 25010000002 KETOROLAC TROMETHAMINE PER 15 MG: Performed by: EMERGENCY MEDICINE

## 2020-04-28 RX ORDER — KETOROLAC TROMETHAMINE 30 MG/ML
15 INJECTION, SOLUTION INTRAMUSCULAR; INTRAVENOUS ONCE
Status: COMPLETED | OUTPATIENT
Start: 2020-04-28 | End: 2020-04-28

## 2020-04-28 RX ORDER — HYDROMORPHONE HCL 110MG/55ML
1 PATIENT CONTROLLED ANALGESIA SYRINGE INTRAVENOUS ONCE
Status: COMPLETED | OUTPATIENT
Start: 2020-04-28 | End: 2020-04-28

## 2020-04-28 RX ORDER — TRAMADOL HYDROCHLORIDE 50 MG/1
50 TABLET ORAL EVERY 6 HOURS PRN
Qty: 30 TABLET | Refills: 0 | Status: SHIPPED | OUTPATIENT
Start: 2020-04-28 | End: 2020-06-05

## 2020-04-28 RX ORDER — DIPHENHYDRAMINE HYDROCHLORIDE 50 MG/ML
25 INJECTION INTRAMUSCULAR; INTRAVENOUS ONCE
Status: COMPLETED | OUTPATIENT
Start: 2020-04-28 | End: 2020-04-28

## 2020-04-28 RX ORDER — DEXAMETHASONE SODIUM PHOSPHATE 10 MG/ML
INJECTION INTRAMUSCULAR; INTRAVENOUS
Status: COMPLETED
Start: 2020-04-28 | End: 2020-04-28

## 2020-04-28 RX ORDER — PROCHLORPERAZINE EDISYLATE 5 MG/ML
10 INJECTION INTRAMUSCULAR; INTRAVENOUS EVERY 6 HOURS PRN
Status: DISCONTINUED | OUTPATIENT
Start: 2020-04-28 | End: 2020-04-28 | Stop reason: HOSPADM

## 2020-04-28 RX ORDER — OXYCODONE HYDROCHLORIDE AND ACETAMINOPHEN 5; 325 MG/1; MG/1
1 TABLET ORAL EVERY 6 HOURS PRN
COMMUNITY
End: 2020-06-05

## 2020-04-28 RX ORDER — TOPIRAMATE 100 MG/1
100 TABLET, FILM COATED ORAL 2 TIMES DAILY
COMMUNITY
End: 2021-03-04

## 2020-04-28 RX ORDER — DEXAMETHASONE SODIUM PHOSPHATE 4 MG/ML
INJECTION, SOLUTION INTRA-ARTICULAR; INTRALESIONAL; INTRAMUSCULAR; INTRAVENOUS; SOFT TISSUE
Status: DISCONTINUED
Start: 2020-04-28 | End: 2020-04-28

## 2020-04-28 RX ORDER — PROMETHAZINE HYDROCHLORIDE 25 MG/1
25 TABLET ORAL EVERY 6 HOURS PRN
COMMUNITY
End: 2020-06-05

## 2020-04-28 RX ADMIN — HYDROMORPHONE HYDROCHLORIDE 1 MG: 2 INJECTION, SOLUTION INTRAMUSCULAR; INTRAVENOUS; SUBCUTANEOUS at 20:12

## 2020-04-28 RX ADMIN — DEXAMETHASONE SODIUM PHOSPHATE 10 MG: 10 INJECTION INTRAMUSCULAR; INTRAVENOUS at 18:54

## 2020-04-28 RX ADMIN — DIPHENHYDRAMINE HYDROCHLORIDE 25 MG: 50 INJECTION, SOLUTION INTRAMUSCULAR; INTRAVENOUS at 18:49

## 2020-04-28 RX ADMIN — SODIUM CHLORIDE 1000 ML: 9 INJECTION, SOLUTION INTRAVENOUS at 18:46

## 2020-04-28 RX ADMIN — KETOROLAC TROMETHAMINE 15 MG: 30 INJECTION, SOLUTION INTRAMUSCULAR at 18:51

## 2020-04-29 ENCOUNTER — EPISODE CHANGES (OUTPATIENT)
Dept: CASE MANAGEMENT | Facility: OTHER | Age: 45
End: 2020-04-29

## 2020-04-29 ENCOUNTER — PATIENT OUTREACH (OUTPATIENT)
Dept: CASE MANAGEMENT | Facility: OTHER | Age: 45
End: 2020-04-29

## 2020-04-29 NOTE — OUTREACH NOTE
ED Potential Covid Discharge Follow-up    Call placed to pt after ED visit 4/28 with COVID 19 testing. Pt states she still has a headache and overall not feeliing well. She does have my chart and knows that her COVID testing was negative. Pt given instructions, as per CDC guidelines,  that even if test negative they are recommending self isolation. Pt should be fever free for72 hours without the use of medication. Symptom free for 72 hours such as cough or shortness of breath. Even though test results negative pt should follow up with their PCP. Pt has not called her PCP but will schedule a follow up visit. Was informed for if she has any questions you can call [facility] 24/7 nurse call center [phone] or for Covid questions [phone]. Agreeable to [facility] calling her PCP and notify of testing. Call to Dr Richmond office spoke with Kiara and notified of testing 4/28 at [facility] ED and of negative results.     Delfina Mcfadden RN  Ambulatory     4/29/2020, 13:50

## 2020-06-05 ENCOUNTER — OFFICE VISIT (OUTPATIENT)
Dept: SURGERY | Facility: CLINIC | Age: 45
End: 2020-06-05

## 2020-06-05 VITALS
HEART RATE: 72 BPM | RESPIRATION RATE: 16 BRPM | HEIGHT: 66 IN | DIASTOLIC BLOOD PRESSURE: 80 MMHG | BODY MASS INDEX: 23.14 KG/M2 | WEIGHT: 144 LBS | SYSTOLIC BLOOD PRESSURE: 124 MMHG

## 2020-06-05 DIAGNOSIS — R11.0 NAUSEA: ICD-10-CM

## 2020-06-05 DIAGNOSIS — Z87.19 HISTORY OF GASTRIC POLYP: ICD-10-CM

## 2020-06-05 DIAGNOSIS — R14.0 BLOATING: ICD-10-CM

## 2020-06-05 DIAGNOSIS — R10.84 GENERALIZED ABDOMINAL PAIN: Primary | ICD-10-CM

## 2020-06-05 DIAGNOSIS — Z86.010 HISTORY OF COLON POLYPS: ICD-10-CM

## 2020-06-05 PROBLEM — Z86.0100 HISTORY OF COLON POLYPS: Status: ACTIVE | Noted: 2020-06-05

## 2020-06-05 PROCEDURE — 99214 OFFICE O/P EST MOD 30 MIN: CPT | Performed by: SURGERY

## 2020-06-05 NOTE — PROGRESS NOTES
Ingrid Zelaya 44 y.o. female presents @ the req of  for eval of LLQ, RLQ pain, bloating, constipation, and nausea.   Chief Complaint   Patient presents with   • Abdominal Pain     RLQ and LLQ              HPI   Above noted and agree. Ingrid has been having a one month history of bilateral lower abdominal pain.  She has been having issues with bloating, constipation, and nausea.  She has a history of colon polyps and gastric polyps.  Her last colonoscopy was over 3 years ago.  She is still smoking.  She has no fevers or chills.  She has no chest pain or shortness of breath.  On review of her records, she had a CT scan over a year ago that showed a possible right ovarian cyst.  She had a hysterectomy many years ago and has not been to see a gynecologist since that time.  She used to see [facility] ob/gyn.        Review of Systems   All other systems reviewed and are negative.            Current Outpatient Medications:   •  omeprazole (priLOSEC) 20 MG capsule, Take 20 mg by mouth Daily., Disp: , Rfl:   •  topiramate (TOPAMAX) 100 MG tablet, Take 100 mg by mouth 2 (Two) Times a Day., Disp: , Rfl:         No Known Allergies        Past Medical History:   Diagnosis Date   • Anemia    • Diverticulitis    • GERD (gastroesophageal reflux disease)    • Hiatal hernia    • Menstrual abnormality     SCHEDULED FOR SX   • Migraine    • Ulcer of abdomen wall (CMS/HCC)    • UTI (urinary tract infection)            Past Surgical History:   Procedure Laterality Date   • CHOLECYSTECTOMY N/A 9/23/2016    Procedure: CHOLECYSTECTOMY LAPAROSCOPIC;  Surgeon: Cordell Dejesus MD;  Location: [facility] OR;  Service:    • COLONOSCOPY N/A 11/14/2017    Procedure: COLONOSCOPY,   polypectomy ;  Surgeon: Anca Kay DO;  Location: [facility] OR;  Service:    • D&C HYSTEROSCOPY MYOSURE N/A 8/10/2016    Procedure: DILATATION AND CURETTAGE HYSTEROSCOPY ;  Surgeon: Frank Alvarado MD;  Location: [facility] OR;  Service:    • DIAGNOSTIC  "LAPAROSCOPY     • ECTOPIC PREGNANCY SURGERY     • ENDOSCOPY N/A 11/14/2017    Procedure: ESOPHAGOGASTRODUODENOSCOPY  with gastric biopsy for  SHERI TEST, POLYPECTOMY ;  Surgeon: Anca Kay DO;  Location: [facility];  Service:    • FOOT SURGERY Right     x3   • HYSTERECTOMY     • [place] LAP, RADICAL HYST W/ TUBE&OV, NODE BX N/A 11/28/2016    Procedure: TOTAL LAPAROSCOPIC HYSTERECTOMY, bilateral salpingectomy, ablation of peritoneal endometriosis;  Surgeon: Frank Alvarado MD;  Location: [facility];  Service: Obstetrics/Gynecology   • TUBAL ABDOMINAL LIGATION             Social History     Tobacco Use   • Smoking status: Current Every Day Smoker     Packs/day: 1.50     Last attempt to quit: 7/17/2016     Years since quitting: 3.8   • Smokeless tobacco: Never Used   Substance Use Topics   • Alcohol use: Yes     Frequency: Never     Comment: rarely   • Drug use: No           Immunization History   Administered Date(s) Administered   • Flu Vaccine Quad PF >36MO 11/28/2016           Physical Exam   Constitutional: She is oriented to person, place, and time. She appears well-developed and well-nourished.   HENT:   Head: Normocephalic and atraumatic.   Right Ear: External ear normal.   Left Ear: External ear normal.   Cardiovascular: Normal rate and regular rhythm.   Pulmonary/Chest: Effort normal and breath sounds normal.   Abdominal: Soft. Bowel sounds are normal.   Musculoskeletal: She exhibits no edema or deformity.   Neurological: She is alert and oriented to person, place, and time.   Skin: Skin is warm and dry.   Psychiatric: She has a normal mood and affect. Her behavior is normal.   Nursing note and vitals reviewed.      Debilities/Disabilities Identified: None    Emotional Behavior: Appropriate      /80   Pulse 72   Resp 16   Ht 167.6 cm (66\")   Wt 65.3 kg (144 lb)   LMP 11/24/2016   BMI 23.24 kg/m²         Ingrid was seen today for abdominal pain.    Diagnoses and all orders for this " visit:    Generalized abdominal pain    Bloating    Nausea    History of gastric polyp    History of colon polyps    We will get Ingrid scheduled for an EGD and colonoscopy and get her into see gynecology.  We discussed the benefits and risks, not limited to but including:  Bleeding, infection, perforation, complications with anesthesia, aspiration.  She appeared to understand and is willing to proceed.    Thank you for allowing me to participate in the care of this interesting patient.

## 2020-06-05 NOTE — H&P (VIEW-ONLY)
Ingrid Zelaya 44 y.o. female presents @ the req of  for eval of LLQ, RLQ pain, bloating, constipation, and nausea.   Chief Complaint   Patient presents with   • Abdominal Pain     RLQ and LLQ              HPI   Above noted and agree. Ingrid has been having a one month history of bilateral lower abdominal pain.  She has been having issues with bloating, constipation, and nausea.  She has a history of colon polyps and gastric polyps.  Her last colonoscopy was over 3 years ago.  She is still smoking.  She has no fevers or chills.  She has no chest pain or shortness of breath.  On review of her records, she had a CT scan over a year ago that showed a possible right ovarian cyst.  She had a hysterectomy many years ago and has not been to see a gynecologist since that time.  She used to see [facility] ob/gyn.        Review of Systems   All other systems reviewed and are negative.            Current Outpatient Medications:   •  omeprazole (priLOSEC) 20 MG capsule, Take 20 mg by mouth Daily., Disp: , Rfl:   •  topiramate (TOPAMAX) 100 MG tablet, Take 100 mg by mouth 2 (Two) Times a Day., Disp: , Rfl:         No Known Allergies        Past Medical History:   Diagnosis Date   • Anemia    • Diverticulitis    • GERD (gastroesophageal reflux disease)    • Hiatal hernia    • Menstrual abnormality     SCHEDULED FOR SX   • Migraine    • Ulcer of abdomen wall (CMS/HCC)    • UTI (urinary tract infection)            Past Surgical History:   Procedure Laterality Date   • CHOLECYSTECTOMY N/A 9/23/2016    Procedure: CHOLECYSTECTOMY LAPAROSCOPIC;  Surgeon: Cordell Dejesus MD;  Location: [facility] OR;  Service:    • COLONOSCOPY N/A 11/14/2017    Procedure: COLONOSCOPY,   polypectomy ;  Surgeon: Anca Kay DO;  Location: [facility] OR;  Service:    • D&C HYSTEROSCOPY MYOSURE N/A 8/10/2016    Procedure: DILATATION AND CURETTAGE HYSTEROSCOPY ;  Surgeon: Frank Alvarado MD;  Location: [facility] OR;  Service:    • DIAGNOSTIC  "LAPAROSCOPY     • ECTOPIC PREGNANCY SURGERY     • ENDOSCOPY N/A 11/14/2017    Procedure: ESOPHAGOGASTRODUODENOSCOPY  with gastric biopsy for  SHERI TEST, POLYPECTOMY ;  Surgeon: Anca Kay DO;  Location: [facility];  Service:    • FOOT SURGERY Right     x3   • HYSTERECTOMY     • [place] LAP, RADICAL HYST W/ TUBE&OV, NODE BX N/A 11/28/2016    Procedure: TOTAL LAPAROSCOPIC HYSTERECTOMY, bilateral salpingectomy, ablation of peritoneal endometriosis;  Surgeon: Frank Alvarado MD;  Location: [facility];  Service: Obstetrics/Gynecology   • TUBAL ABDOMINAL LIGATION             Social History     Tobacco Use   • Smoking status: Current Every Day Smoker     Packs/day: 1.50     Last attempt to quit: 7/17/2016     Years since quitting: 3.8   • Smokeless tobacco: Never Used   Substance Use Topics   • Alcohol use: Yes     Frequency: Never     Comment: rarely   • Drug use: No           Immunization History   Administered Date(s) Administered   • Flu Vaccine Quad PF >36MO 11/28/2016           Physical Exam   Constitutional: She is oriented to person, place, and time. She appears well-developed and well-nourished.   HENT:   Head: Normocephalic and atraumatic.   Right Ear: External ear normal.   Left Ear: External ear normal.   Cardiovascular: Normal rate and regular rhythm.   Pulmonary/Chest: Effort normal and breath sounds normal.   Abdominal: Soft. Bowel sounds are normal.   Musculoskeletal: She exhibits no edema or deformity.   Neurological: She is alert and oriented to person, place, and time.   Skin: Skin is warm and dry.   Psychiatric: She has a normal mood and affect. Her behavior is normal.   Nursing note and vitals reviewed.      Debilities/Disabilities Identified: None    Emotional Behavior: Appropriate      /80   Pulse 72   Resp 16   Ht 167.6 cm (66\")   Wt 65.3 kg (144 lb)   LMP 11/24/2016   BMI 23.24 kg/m²         Ingrid was seen today for abdominal pain.    Diagnoses and all orders for this " visit:    Generalized abdominal pain    Bloating    Nausea    History of gastric polyp    History of colon polyps    We will get Ingrid scheduled for an EGD and colonoscopy and get her into see gynecology.  We discussed the benefits and risks, not limited to but including:  Bleeding, infection, perforation, complications with anesthesia, aspiration.  She appeared to understand and is willing to proceed.    Thank you for allowing me to participate in the care of this interesting patient.

## 2020-06-05 NOTE — H&P
Ingrid Zelaya 44 y.o. female presents @ the req of  for eval of LLQ, RLQ pain, bloating, constipation, and nausea.   Chief Complaint   Patient presents with   • Abdominal Pain     RLQ and LLQ              HPI   Above noted and agree. Ingrid has been having a one month history of bilateral lower abdominal pain.  She has been having issues with bloating, constipation, and nausea.  She has a history of colon polyps and gastric polyps.  Her last colonoscopy was over 3 years ago.  She is still smoking.  She has no fevers or chills.  She has no chest pain or shortness of breath.  On review of her records, she had a CT scan over a year ago that showed a possible right ovarian cyst.  She had a hysterectomy many years ago and has not been to see a gynecologist since that time.  She used to see [facility] ob/gyn.        Review of Systems   All other systems reviewed and are negative.            Current Outpatient Medications:   •  omeprazole (priLOSEC) 20 MG capsule, Take 20 mg by mouth Daily., Disp: , Rfl:   •  topiramate (TOPAMAX) 100 MG tablet, Take 100 mg by mouth 2 (Two) Times a Day., Disp: , Rfl:         No Known Allergies        Past Medical History:   Diagnosis Date   • Anemia    • Diverticulitis    • GERD (gastroesophageal reflux disease)    • Hiatal hernia    • Menstrual abnormality     SCHEDULED FOR SX   • Migraine    • Ulcer of abdomen wall (CMS/HCC)    • UTI (urinary tract infection)            Past Surgical History:   Procedure Laterality Date   • CHOLECYSTECTOMY N/A 9/23/2016    Procedure: CHOLECYSTECTOMY LAPAROSCOPIC;  Surgeon: Cordell Dejesus MD;  Location: [facility] OR;  Service:    • COLONOSCOPY N/A 11/14/2017    Procedure: COLONOSCOPY,   polypectomy ;  Surgeon: Anca Kay DO;  Location: [facility] OR;  Service:    • D&C HYSTEROSCOPY MYOSURE N/A 8/10/2016    Procedure: DILATATION AND CURETTAGE HYSTEROSCOPY ;  Surgeon: Frank Alvarado MD;  Location: [facility] OR;  Service:    • DIAGNOSTIC  "LAPAROSCOPY     • ECTOPIC PREGNANCY SURGERY     • ENDOSCOPY N/A 11/14/2017    Procedure: ESOPHAGOGASTRODUODENOSCOPY  with gastric biopsy for  SHERI TEST, POLYPECTOMY ;  Surgeon: Anca Kay DO;  Location: [facility];  Service:    • FOOT SURGERY Right     x3   • HYSTERECTOMY     • [place] LAP, RADICAL HYST W/ TUBE&OV, NODE BX N/A 11/28/2016    Procedure: TOTAL LAPAROSCOPIC HYSTERECTOMY, bilateral salpingectomy, ablation of peritoneal endometriosis;  Surgeon: Frank Alvarado MD;  Location: [facility];  Service: Obstetrics/Gynecology   • TUBAL ABDOMINAL LIGATION             Social History     Tobacco Use   • Smoking status: Current Every Day Smoker     Packs/day: 1.50     Last attempt to quit: 7/17/2016     Years since quitting: 3.8   • Smokeless tobacco: Never Used   Substance Use Topics   • Alcohol use: Yes     Frequency: Never     Comment: rarely   • Drug use: No           Immunization History   Administered Date(s) Administered   • Flu Vaccine Quad PF >36MO 11/28/2016           Physical Exam   Constitutional: She is oriented to person, place, and time. She appears well-developed and well-nourished.   HENT:   Head: Normocephalic and atraumatic.   Right Ear: External ear normal.   Left Ear: External ear normal.   Cardiovascular: Normal rate and regular rhythm.   Pulmonary/Chest: Effort normal and breath sounds normal.   Abdominal: Soft. Bowel sounds are normal.   Musculoskeletal: She exhibits no edema or deformity.   Neurological: She is alert and oriented to person, place, and time.   Skin: Skin is warm and dry.   Psychiatric: She has a normal mood and affect. Her behavior is normal.   Nursing note and vitals reviewed.      Debilities/Disabilities Identified: None    Emotional Behavior: Appropriate      /80   Pulse 72   Resp 16   Ht 167.6 cm (66\")   Wt 65.3 kg (144 lb)   LMP 11/24/2016   BMI 23.24 kg/m²          Ingrid was seen today for abdominal pain.    Diagnoses and all orders for this " visit:    Generalized abdominal pain    Bloating    Nausea    History of gastric polyp    History of colon polyps    We will get Ingrid scheduled for an EGD and colonoscopy and get her into see gynecology.  We discussed the benefits and risks, not limited to but including:  Bleeding, infection, perforation, complications with anesthesia, aspiration.  She appeared to understand and is willing to proceed.    Thank you for allowing me to participate in the care of this interesting patient.

## 2020-06-09 ENCOUNTER — TELEPHONE (OUTPATIENT)
Dept: SURGERY | Facility: CLINIC | Age: 45
End: 2020-06-09

## 2020-06-09 DIAGNOSIS — Z01.419 WELL FEMALE EXAM WITH ROUTINE GYNECOLOGICAL EXAM: Primary | ICD-10-CM

## 2020-06-09 NOTE — TELEPHONE ENCOUNTER
Per pt req, appt sched with Dr. Alvarado 07/24/2020 @ 9:45 am.  Attempt made to reach pt to inform.  NA/NO VM.  Letter sent to pt via Epic.

## 2020-06-16 ENCOUNTER — TRANSCRIBE ORDERS (OUTPATIENT)
Dept: ADMINISTRATIVE | Facility: HOSPITAL | Age: 45
End: 2020-06-16

## 2020-06-16 DIAGNOSIS — Z01.818 PREOP EXAMINATION: Primary | ICD-10-CM

## 2020-06-20 ENCOUNTER — LAB (OUTPATIENT)
Dept: LAB | Facility: HOSPITAL | Age: 45
End: 2020-06-20

## 2020-06-20 DIAGNOSIS — Z01.818 PREOP EXAMINATION: ICD-10-CM

## 2020-06-20 PROCEDURE — C9803 HOPD COVID-19 SPEC COLLECT: HCPCS

## 2020-06-20 PROCEDURE — U0004 COV-19 TEST NON-CDC HGH THRU: HCPCS

## 2020-06-20 PROCEDURE — U0002 COVID-19 LAB TEST NON-CDC: HCPCS

## 2020-06-22 ENCOUNTER — ANESTHESIA EVENT (OUTPATIENT)
Dept: PERIOP | Facility: HOSPITAL | Age: 45
End: 2020-06-22

## 2020-06-22 LAB
REF LAB TEST METHOD: NORMAL
SARS-COV-2 RNA RESP QL NAA+PROBE: NOT DETECTED

## 2020-06-23 ENCOUNTER — HOSPITAL ENCOUNTER (OUTPATIENT)
Facility: HOSPITAL | Age: 45
Setting detail: HOSPITAL OUTPATIENT SURGERY
Discharge: HOME | End: 2020-06-23
Attending: SURGERY | Admitting: SURGERY

## 2020-06-23 ENCOUNTER — ANESTHESIA (OUTPATIENT)
Dept: PERIOP | Facility: HOSPITAL | Age: 45
End: 2020-06-23

## 2020-06-23 VITALS
OXYGEN SATURATION: 100 % | HEIGHT: 66 IN | TEMPERATURE: 97.7 F | WEIGHT: 126 LBS | HEART RATE: 88 BPM | RESPIRATION RATE: 15 BRPM | SYSTOLIC BLOOD PRESSURE: 103 MMHG | DIASTOLIC BLOOD PRESSURE: 75 MMHG | BODY MASS INDEX: 20.25 KG/M2

## 2020-06-23 DIAGNOSIS — R14.0 BLOATING: ICD-10-CM

## 2020-06-23 DIAGNOSIS — Z87.19 HISTORY OF GASTRIC POLYP: ICD-10-CM

## 2020-06-23 DIAGNOSIS — Z86.010 HISTORY OF COLON POLYPS: ICD-10-CM

## 2020-06-23 DIAGNOSIS — R11.0 NAUSEA: ICD-10-CM

## 2020-06-23 DIAGNOSIS — R10.84 GENERALIZED ABDOMINAL PAIN: ICD-10-CM

## 2020-06-23 PROCEDURE — 45380 COLONOSCOPY AND BIOPSY: CPT | Performed by: SURGERY

## 2020-06-23 PROCEDURE — 87081 CULTURE SCREEN ONLY: CPT | Performed by: SURGERY

## 2020-06-23 PROCEDURE — 43239 EGD BIOPSY SINGLE/MULTIPLE: CPT | Performed by: SURGERY

## 2020-06-23 PROCEDURE — 25010000002 PROPOFOL 10 MG/ML EMULSION: Performed by: NURSE ANESTHETIST, CERTIFIED REGISTERED

## 2020-06-23 PROCEDURE — 88305 TISSUE EXAM BY PATHOLOGIST: CPT | Performed by: SURGERY

## 2020-06-23 RX ORDER — PROPOFOL 10 MG/ML
VIAL (ML) INTRAVENOUS AS NEEDED
Status: DISCONTINUED | OUTPATIENT
Start: 2020-06-23 | End: 2020-06-23 | Stop reason: SURG

## 2020-06-23 RX ORDER — SODIUM CHLORIDE, SODIUM LACTATE, POTASSIUM CHLORIDE, CALCIUM CHLORIDE 600; 310; 30; 20 MG/100ML; MG/100ML; MG/100ML; MG/100ML
100 INJECTION, SOLUTION INTRAVENOUS CONTINUOUS
Status: DISCONTINUED | OUTPATIENT
Start: 2020-06-23 | End: 2020-06-23 | Stop reason: HOSPADM

## 2020-06-23 RX ORDER — SODIUM CHLORIDE 0.9 % (FLUSH) 0.9 %
10 SYRINGE (ML) INJECTION EVERY 12 HOURS SCHEDULED
Status: DISCONTINUED | OUTPATIENT
Start: 2020-06-23 | End: 2020-06-23 | Stop reason: HOSPADM

## 2020-06-23 RX ORDER — SODIUM CHLORIDE 0.9 % (FLUSH) 0.9 %
10 SYRINGE (ML) INJECTION AS NEEDED
Status: DISCONTINUED | OUTPATIENT
Start: 2020-06-23 | End: 2020-06-23 | Stop reason: HOSPADM

## 2020-06-23 RX ORDER — LIDOCAINE HYDROCHLORIDE 10 MG/ML
0.5 INJECTION, SOLUTION EPIDURAL; INFILTRATION; INTRACAUDAL; PERINEURAL ONCE AS NEEDED
Status: COMPLETED | OUTPATIENT
Start: 2020-06-23 | End: 2020-06-23

## 2020-06-23 RX ORDER — LIDOCAINE HYDROCHLORIDE 20 MG/ML
INJECTION, SOLUTION INFILTRATION; PERINEURAL AS NEEDED
Status: DISCONTINUED | OUTPATIENT
Start: 2020-06-23 | End: 2020-06-23 | Stop reason: SURG

## 2020-06-23 RX ORDER — SUCRALFATE 1 G/1
1 TABLET ORAL 4 TIMES DAILY
Qty: 120 TABLET | Refills: 1 | Status: SHIPPED | OUTPATIENT
Start: 2020-06-23 | End: 2021-03-04

## 2020-06-23 RX ORDER — SODIUM CHLORIDE, SODIUM LACTATE, POTASSIUM CHLORIDE, CALCIUM CHLORIDE 600; 310; 30; 20 MG/100ML; MG/100ML; MG/100ML; MG/100ML
9 INJECTION, SOLUTION INTRAVENOUS CONTINUOUS
Status: DISCONTINUED | OUTPATIENT
Start: 2020-06-23 | End: 2020-06-23 | Stop reason: HOSPADM

## 2020-06-23 RX ORDER — SODIUM CHLORIDE 9 MG/ML
40 INJECTION, SOLUTION INTRAVENOUS AS NEEDED
Status: DISCONTINUED | OUTPATIENT
Start: 2020-06-23 | End: 2020-06-23 | Stop reason: HOSPADM

## 2020-06-23 RX ORDER — MIDAZOLAM HYDROCHLORIDE 2 MG/2ML
1 INJECTION, SOLUTION INTRAMUSCULAR; INTRAVENOUS
Status: DISCONTINUED | OUTPATIENT
Start: 2020-06-23 | End: 2020-06-23

## 2020-06-23 RX ORDER — ONDANSETRON 2 MG/ML
4 INJECTION INTRAMUSCULAR; INTRAVENOUS ONCE AS NEEDED
Status: DISCONTINUED | OUTPATIENT
Start: 2020-06-23 | End: 2020-06-23 | Stop reason: HOSPADM

## 2020-06-23 RX ADMIN — PROPOFOL 50 MG: 10 INJECTION, EMULSION INTRAVENOUS at 09:26

## 2020-06-23 RX ADMIN — PROPOFOL 50 MG: 10 INJECTION, EMULSION INTRAVENOUS at 09:47

## 2020-06-23 RX ADMIN — PROPOFOL 50 MG: 10 INJECTION, EMULSION INTRAVENOUS at 09:44

## 2020-06-23 RX ADMIN — PROPOFOL 50 MG: 10 INJECTION, EMULSION INTRAVENOUS at 09:40

## 2020-06-23 RX ADMIN — PROPOFOL 100 MG: 10 INJECTION, EMULSION INTRAVENOUS at 09:23

## 2020-06-23 RX ADMIN — LIDOCAINE HYDROCHLORIDE 100 MG: 20 INJECTION, SOLUTION INFILTRATION; PERINEURAL at 09:20

## 2020-06-23 RX ADMIN — SODIUM CHLORIDE, POTASSIUM CHLORIDE, SODIUM LACTATE AND CALCIUM CHLORIDE 9 ML/HR: 600; 310; 30; 20 INJECTION, SOLUTION INTRAVENOUS at 08:31

## 2020-06-23 RX ADMIN — PROPOFOL 50 MG: 10 INJECTION, EMULSION INTRAVENOUS at 09:51

## 2020-06-23 RX ADMIN — LIDOCAINE HYDROCHLORIDE 0.1 ML: 10 INJECTION, SOLUTION EPIDURAL; INFILTRATION; INTRACAUDAL; PERINEURAL at 08:31

## 2020-06-23 RX ADMIN — PROPOFOL 50 MG: 10 INJECTION, EMULSION INTRAVENOUS at 09:29

## 2020-06-23 RX ADMIN — PROPOFOL 50 MG: 10 INJECTION, EMULSION INTRAVENOUS at 09:32

## 2020-06-23 NOTE — ANESTHESIA PREPROCEDURE EVALUATION
Anesthesia Evaluation     Patient summary reviewed and Nursing notes reviewed   no history of anesthetic complications:  NPO Solid Status: > 8 hours  NPO Liquid Status: > 8 hours           Airway   Mallampati: II  TM distance: <3 FB  Neck ROM: full  No difficulty expected  Dental - normal exam     Pulmonary - negative pulmonary ROS and normal exam    breath sounds clear to auscultation  Cardiovascular - normal exam  Exercise tolerance: excellent (>7 METS)    ECG reviewed  Rhythm: regular  Rate: normal        Neuro/Psych  (+) headaches (2 days ago, topomax and immitrex),     GI/Hepatic/Renal/Endo    (+)  hiatal hernia, GERD poorly controlled,      Musculoskeletal (-) negative ROS    Abdominal  - normal exam   Substance History - negative use  Alcohol use: very occasional.     OB/GYN          Other                        Anesthesia Plan    ASA 2     MAC     intravenous induction     Anesthetic plan, all risks, benefits, and alternatives have been provided, discussed and informed consent has been obtained with: patient.  Use of blood products discussed with patient  Consented to blood products.

## 2020-06-23 NOTE — OP NOTE
EGD and Colonoscopy Procedure Note      Pre-operative Diagnosis:  Generalized abdominal pain [R10.84]  Bloating [R14.0]  Nausea [R11.0]  History of gastric polyp [Z87.19]  History of colon polyps [Z86.010]    Post-operative Diagnosis: Esophagitis, hiatal hernia, gastritis, duodenitis, polyps of the sigmoid colon and hepatic flexure, diverticulosis    Procedure:  EGD with biopsies with cold forceps and  Colonoscopy with polypectomy with cold forceps    Surgeon: Rahul    Anesthetic: MAC     Estimated Blood Loss: Minimal    Complications: None    Indications: See preoperative diagnosis    Findings/Treatments:   Esophagitis-biopsy of GE junction with cold forceps  Gastritis and duodenitis-biopsy for H. pylori cold forceps  Hepatic flexure polyp x1-removed with cold forceps  Sigmoid colon polyp x1-removed cold forceps       Scope Withdrawal Time:  > 6 minutes      Recommendations: Await pathology      Procedure Details     After discussing the benefits and risks of an EGD and Colonoscopy, benefits and risks not limited to but including:  Bleeding, infection, perforation, aspiration; informed consent was signed.  The patient was taken into the endoscopy suite at [facility] and placed in the left lateral decubitus position.  MAC anesthesia was induced under appropriate monitoring.  Bite block was placed and the gastroscope was inserted thru such and advanced under direct vision to second portion of the duodenum.  A careful inspection was made as the gastroscope was withdrawn, including a retroflexed view of the proximal stomach; findings and interventions are described below.  If biopsies were taken, this was done with the cold biopsy forceps.    The bed was then rotated 180 degrees.  A rectal exam was performed.  Sphincter tone was normal.  The colonoscope was then inserted and carefully advanced to the cecum while visualizing the mucosa.  The cecum was identified by the ileocecal valve and the  orifice of the appendix.  Once in the cecum the scope was slowly withdrawn will carefully evaluate mucosa and deflating air.  There was a large polyp in the hepatic flexure that was removed in piecemeal fashion with cold forceps.  The scope was then slowly withdrawn will carefully evaluating the mucosa and deflating air.  In the sigmoid colon there was a polyp was removed cold forceps there was also diverticulosis.  Once in the rectum the scope was retroflexed straightened and removed.  Ingrid was then taken the recovery area in stable postoperative condition having tolerated procedure well.    Anca Kay DO

## 2020-06-23 NOTE — ANESTHESIA POSTPROCEDURE EVALUATION
Patient: Ingrid Zelaya    Procedure Summary     Date:  06/23/20 Room / Location:  [facility] ENDOSCOPY 1 /  [facility] OR    Anesthesia Start:  0919 Anesthesia Stop:  0957    Procedures:       Esophagogastroduodenoscopy with possible biopsy, polypectomy, dilation (N/A Esophagus)      Colonoscopy with possible biopsy or polypectomy (N/A ) Diagnosis:       Generalized abdominal pain      Bloating      Nausea      History of gastric polyp      History of colon polyps      (Generalized abdominal pain [R10.84])      (Bloating [R14.0])      (Nausea [R11.0])      (History of gastric polyp [Z87.19])      (History of colon polyps [Z86.010])    Surgeon:  Anca Kay DO Provider:  Cyndi Gamboa CRNA    Anesthesia Type:  MAC ASA Status:  2          Anesthesia Type: MAC    Vitals  Vitals Value Taken Time   /75 6/23/2020 10:10 AM   Temp 97.7 °F (36.5 °C) 6/23/2020 10:00 AM   Pulse 88 6/23/2020 10:10 AM   Resp 15 6/23/2020 10:10 AM   SpO2 100 % 6/23/2020 10:10 AM           Post Anesthesia Care and Evaluation    Patient location during evaluation: bedside  Patient participation: complete - patient participated  Level of consciousness: awake and alert  Pain score: 0  Pain management: adequate  Airway patency: patent  Anesthetic complications: No anesthetic complications  PONV Status: none  Cardiovascular status: acceptable  Respiratory status: acceptable  Hydration status: acceptable

## 2020-06-23 NOTE — INTERVAL H&P NOTE
"  H&P reviewed. The patient was examined and there are no changes to the H&P.       Pulse 99   Temp 98 °F (36.7 °C) (Oral)   Resp 15   Ht 167.6 cm (65.98\")   Wt 57.2 kg (126 lb)   LMP 11/24/2016   SpO2 98%   BMI 20.35 kg/m²         "

## 2020-06-24 LAB
CYTO UR: NORMAL
LAB AP CASE REPORT: NORMAL
PATH REPORT.FINAL DX SPEC: NORMAL
PATH REPORT.GROSS SPEC: NORMAL
UREASE TISS QL: NEGATIVE

## 2020-07-24 ENCOUNTER — OFFICE VISIT (OUTPATIENT)
Dept: OBSTETRICS AND GYNECOLOGY | Facility: CLINIC | Age: 45
End: 2020-07-24

## 2020-07-24 VITALS
BODY MASS INDEX: 21.21 KG/M2 | WEIGHT: 132 LBS | DIASTOLIC BLOOD PRESSURE: 64 MMHG | SYSTOLIC BLOOD PRESSURE: 100 MMHG | HEIGHT: 66 IN

## 2020-07-24 DIAGNOSIS — Z13.9 SCREENING FOR CONDITION: ICD-10-CM

## 2020-07-24 DIAGNOSIS — Z01.419 PAP SMEAR, LOW-RISK: ICD-10-CM

## 2020-07-24 DIAGNOSIS — Z01.419 ENCOUNTER FOR GYNECOLOGICAL EXAMINATION WITHOUT ABNORMAL FINDING: Primary | ICD-10-CM

## 2020-07-24 LAB
BILIRUB BLD-MCNC: NEGATIVE MG/DL
CLARITY, POC: CLEAR
COLOR UR: YELLOW
GLUCOSE UR STRIP-MCNC: NEGATIVE MG/DL
KETONES UR QL: NEGATIVE
LEUKOCYTE EST, POC: NEGATIVE
NITRITE UR-MCNC: NEGATIVE MG/ML
PH UR: 6 [PH] (ref 5–8)
PROT UR STRIP-MCNC: NEGATIVE MG/DL
RBC # UR STRIP: NEGATIVE /UL
SP GR UR: 1.02 (ref 1–1.03)
UROBILINOGEN UR QL: NORMAL

## 2020-07-24 PROCEDURE — 99386 PREV VISIT NEW AGE 40-64: CPT | Performed by: OBSTETRICS & GYNECOLOGY

## 2020-07-24 RX ORDER — SUMATRIPTAN 25 MG/1
TABLET, FILM COATED ORAL
COMMUNITY
Start: 2020-07-08 | End: 2021-03-04

## 2020-07-24 RX ORDER — VARENICLINE TARTRATE 1 MG/1
TABLET, FILM COATED ORAL
COMMUNITY
Start: 2020-07-09 | End: 2021-03-04

## 2020-07-24 RX ORDER — TOPIRAMATE 25 MG/1
TABLET ORAL
COMMUNITY
Start: 2020-06-08 | End: 2021-03-04

## 2020-07-24 NOTE — PROGRESS NOTES
GYN Annual Exam     CC- Here for annual exam.     Ingrid Zelaya is a 44 y.o. female who presents for annual well woman exam. Periods are absent.  Hysterectomy.    OB History        1    Para        Term                AB        Living           SAB        TAB        Ectopic        Molar        Multiple        Live Births                    Current contraception: status post hysterectomy  History of abnormal Pap smear: no  Family history of uterine, colon or ovarian cancer: no  History of abnormal mammogram: no  Family history of breast cancer: no  Last Pap : 2016    Past Medical History:   Diagnosis Date   • Anemia    • Diverticulitis    • GERD (gastroesophageal reflux disease)    • Hiatal hernia    • Menstrual abnormality     SCHEDULED FOR SX   • Migraine    • Ulcer of abdomen wall (CMS/HCC)    • UTI (urinary tract infection)        Past Surgical History:   Procedure Laterality Date   • CHOLECYSTECTOMY N/A 2016    Procedure: CHOLECYSTECTOMY LAPAROSCOPIC;  Surgeon: Cordell Dejesus MD;  Location: [facility] OR;  Service:    • COLONOSCOPY N/A 2017    Procedure: COLONOSCOPY,   polypectomy ;  Surgeon: Anca Kay DO;  Location: formerly [facility] OR;  Service:    • COLONOSCOPY N/A 2020    Procedure: Colonoscopy with possible biopsy or polypectomy;  Surgeon: Anca Kay DO;  Location: formerly [facility] OR;  Service: Gastroenterology;  Laterality: N/A;  SIGMOID POLYP  HEPATIC FLEXURE POLYP  DIVERTICULOSIS     • D&C HYSTEROSCOPY MYOSURE N/A 8/10/2016    Procedure: DILATATION AND CURETTAGE HYSTEROSCOPY ;  Surgeon: Frank Alvarado MD;  Location: formerly [facility] OR;  Service:    • DIAGNOSTIC LAPAROSCOPY     • ECTOPIC PREGNANCY SURGERY     • ENDOSCOPY N/A 2017    Procedure: ESOPHAGOGASTRODUODENOSCOPY  with gastric biopsy for  SHERI TEST, POLYPECTOMY ;  Surgeon: Anca Kay DO;  Location: formerly [facility] OR;  Service:    • ENDOSCOPY N/A 2020    Procedure: Esophagogastroduodenoscopy with possible  biopsy, polypectomy, dilation;  Surgeon: Anca Kay DO;  Location: [facility];  Service: Gastroenterology;  Laterality: N/A;  SHERI TEST  HIATAL HERNIA  GE JUNCTION BIOPSY   • FOOT SURGERY Right     x3   • HYSTERECTOMY     • PA LAP, RADICAL HYST W/ TUBE&OV, NODE BX N/A 11/28/2016    Procedure: TOTAL LAPAROSCOPIC HYSTERECTOMY, bilateral salpingectomy, ablation of peritoneal endometriosis;  Surgeon: Frank Alvarado MD;  Location: [facility];  Service: Obstetrics/Gynecology   • TUBAL ABDOMINAL LIGATION           Current Outpatient Medications:   •  CHANTIX CONTINUING MONTH JONNIE 1 MG tablet, , Disp: , Rfl:   •  CHANTIX STARTING MONTH JONNIE 0.5 MG X 11 & 1 MG X 42 tablet, , Disp: , Rfl:   •  omeprazole (priLOSEC) 20 MG capsule, Take 40 mg by mouth Daily., Disp: , Rfl:   •  sucralfate (Carafate) 1 g tablet, Take 1 tablet by mouth 4 (Four) Times a Day., Disp: 120 tablet, Rfl: 1  •  SUMAtriptan (IMITREX) 25 MG tablet, , Disp: , Rfl:   •  topiramate (TOPAMAX) 100 MG tablet, Take 100 mg by mouth 2 (Two) Times a Day., Disp: , Rfl:   •  topiramate (TOPAMAX) 25 MG tablet, , Disp: , Rfl:     No Known Allergies    Social History     Tobacco Use   • Smoking status: Current Every Day Smoker     Packs/day: 1.50   • Smokeless tobacco: Never Used   Substance Use Topics   • Alcohol use: Yes     Frequency: Never     Comment: rarely   • Drug use: No         Family History   Problem Relation Age of Onset   • Hypertension Mother    • Cancer Father    • Hypertension Father        Review of Systems   Constitutional: Negative for appetite change, fever and unexpected weight change.   HENT: Negative for congestion and sore throat.    Respiratory: Negative for cough and shortness of breath.    Cardiovascular: Negative for chest pain and palpitations.   Gastrointestinal: Negative for abdominal distention, abdominal pain, constipation, diarrhea, nausea and vomiting.   Endocrine: Negative.    Genitourinary: Negative for dyspareunia,  "menstrual problem, pelvic pain and vaginal discharge.   Skin: Negative.    Neurological: Negative for dizziness and syncope.   Hematological: Negative.    Psychiatric/Behavioral: Negative for dysphoric mood and sleep disturbance. The patient is not nervous/anxious.        /64   Ht 167.6 cm (65.98\")   Wt 59.9 kg (132 lb)   LMP 11/24/2016   BMI 21.32 kg/m²     Physical Exam   Constitutional: She is oriented to person, place, and time. She appears well-developed and well-nourished.   HENT:   Head: Normocephalic and atraumatic.   Neck: Normal range of motion. Neck supple. No thyromegaly present.   Cardiovascular: Normal rate and regular rhythm.   Pulmonary/Chest: Effort normal and breath sounds normal. Right breast exhibits no mass and no nipple discharge. Left breast exhibits no mass and no nipple discharge. No breast swelling, tenderness, discharge or bleeding. Breasts are symmetrical.   Abdominal: Soft. Bowel sounds are normal. She exhibits no distension and no mass. There is no tenderness. There is no rebound and no guarding.   Genitourinary: Vagina normal. No breast swelling, tenderness, discharge or bleeding. Pelvic exam was performed with patient prone. There is no lesion on the right labia. There is no lesion on the left labia. Right adnexum displays no mass and no tenderness. Left adnexum displays no mass and no tenderness.   Musculoskeletal: Normal range of motion. She exhibits no edema.   Neurological: She is alert and oriented to person, place, and time.   Skin: Skin is warm and dry.   Psychiatric: She has a normal mood and affect. Her behavior is normal. Judgment and thought content normal.   Nursing note and vitals reviewed.      Diagnoses and all orders for this visit:    Encounter for gynecological examination without abnormal finding    Screening for condition  -     POC Urinalysis Dipstick  -     Mammo Screening Digital Tomosynthesis Bilateral With CAD; Future    Other orders  -     CHANTIX " CONTINUING MONTH JONNIE 1 MG tablet  -     CHANTIX STARTING MONTH JONNIE 0.5 MG X 11 & 1 MG X 42 tablet  -     topiramate (TOPAMAX) 25 MG tablet  -     SUMAtriptan (IMITREX) 25 MG tablet        Assessment     1) GYN annual well woman exam.   2) mammogram ordered     Plan     1) Breast Health - Clinical breast exam & mammogram yearly, Self breast awareness monthly  2) Pap - done today  3) Smoking status - Ingrid Zelaya  reports that she has been smoking. She has been smoking about 1.50 packs per day. She has never used smokeless tobacco.. I have educated her on the risk of diseases from using tobacco products such as cancer, COPD and heart diease.     I advised her to quit and she is not willing to quit.    I spent 3  minutes counseling the patient.  4) Colon health - screening colonoscopy recommended if not up to date  5) Bone health - Weight bearing exercise, dietary calcium recommendations and vitamin D reviewed.   6) Seat belts recommended  7) Follow up prn and one year    Encounter Diagnoses   Name Primary?   • Encounter for gynecological examination without abnormal finding Yes   • Screening for condition          Frank Alvarado MD  7/24/2020  10:18

## 2020-07-27 ENCOUNTER — OFFICE VISIT (OUTPATIENT)
Dept: SURGERY | Facility: CLINIC | Age: 45
End: 2020-07-27

## 2020-07-27 VITALS — TEMPERATURE: 98.1 F

## 2020-07-27 DIAGNOSIS — Z72.0 TOBACCO ABUSE: ICD-10-CM

## 2020-07-27 DIAGNOSIS — K27.9 PEPTIC ULCER DISEASE: Primary | ICD-10-CM

## 2020-07-27 DIAGNOSIS — K63.5 POLYP OF COLON, UNSPECIFIED PART OF COLON, UNSPECIFIED TYPE: ICD-10-CM

## 2020-07-27 DIAGNOSIS — R11.0 POSTPRANDIAL NAUSEA: ICD-10-CM

## 2020-07-27 PROCEDURE — 99213 OFFICE O/P EST LOW 20 MIN: CPT | Performed by: SURGERY

## 2020-07-27 NOTE — PROGRESS NOTES
Ingrid Zelaya 44 y.o. female presents for PO FU EGD/C-SCOPE.  Pt c/o nausea after meals.  Today she ate a 3 inch sub from [employer] and immediately had nausea.       HPI   Above-noted agree.  Galina had gastritis and a hiatal hernia.  She was H. pylori negative.  She also had some adenomatous colon polyps.  She does smoke cigarettes.  She is taking Prilosec and Carafate.  She is still getting nauseated after she eats.  She ate some with sandwich from [employer] and was nauseated afterwards.  She has no chest pain or shortness of breath.  She is already had her gallbladder removed.  She has no other complaints.      Review of Systems        Past Medical History:   Diagnosis Date   • Anemia    • Diverticulitis    • GERD (gastroesophageal reflux disease)    • Hiatal hernia    • Menstrual abnormality     SCHEDULED FOR SX   • Migraine    • Ulcer of abdomen wall (CMS/HCC)    • UTI (urinary tract infection)            Past Surgical History:   Procedure Laterality Date   • CHOLECYSTECTOMY N/A 9/23/2016    Procedure: CHOLECYSTECTOMY LAPAROSCOPIC;  Surgeon: Cordell Dejesus MD;  [facility];  Service:    • COLONOSCOPY N/A 11/14/2017    Procedure: COLONOSCOPY,   polypectomy ;  Surgeon: Anca Kay DO;  Location[facility];  Service:    • COLONOSCOPY N/A 6/23/2020    Procedure: Colonoscopy with possible biopsy or polypectomy;  Surgeon: Anca Kay DO;  Location[facility];  Service: Gastroenterology;  Laterality: N/A;  SIGMOID POLYP  HEPATIC FLEXURE POLYP  DIVERTICULOSIS     • D&C HYSTEROSCOPY MYOSURE N/A 8/10/2016    Procedure: DILATATION AND CURETTAGE HYSTEROSCOPY ;  Surgeon: Frank Alvarado MD;  Location: [facility];  Service:    • DIAGNOSTIC LAPAROSCOPY     • ECTOPIC PREGNANCY SURGERY     • ENDOSCOPY N/A 11/14/2017    Procedure: ESOPHAGOGASTRODUODENOSCOPY  with gastric biopsy for  SHERI TEST, POLYPECTOMY ;  Surgeon: Anca Kay DO;  Location: [facility];  Service:    • ENDOSCOPY N/A 6/23/2020     Procedure: Esophagogastroduodenoscopy with possible biopsy, polypectomy, dilation;  Surgeon: Anca Kay DO;  Location: [facility] OR;  Service: Gastroenterology;  Laterality: N/A;  SHERI TEST  HIATAL HERNIA  GE JUNCTION BIOPSY   • FOOT SURGERY Right     x3   • HYSTERECTOMY     • CA LAP, RADICAL HYST W/ TUBE&OV, NODE BX N/A 11/28/2016    Procedure: TOTAL LAPAROSCOPIC HYSTERECTOMY, bilateral salpingectomy, ablation of peritoneal endometriosis;  Surgeon: Frank Alvarado MD;  Location: [facility] OR;  Service: Obstetrics/Gynecology   • TUBAL ABDOMINAL LIGATION             Physical Exam   Constitutional: She is oriented to person, place, and time. She appears well-developed and well-nourished.   Musculoskeletal: She exhibits no edema or deformity.   Neurological: She is alert and oriented to person, place, and time.   Skin: Skin is warm and dry.   Psychiatric: She has a normal mood and affect. Her behavior is normal.           Temp 98.1 °F (36.7 °C)   LMP 11/24/2016         Ingrid was seen today for post-op follow-up.    Diagnoses and all orders for this visit:    Peptic ulcer disease    Polyp of colon, unspecified part of colon, unspecified type    Tobacco abuse    Postprandial nausea    We discussed tobacco cessation as well as dietary changes.  We will order a gastric emptying study.    Thank you for allowing me to participate in the care of this interesting patient.

## 2020-07-30 LAB
CYTOLOGIST CVX/VAG CYTO: NORMAL
CYTOLOGY CVX/VAG DOC CYTO: NORMAL
CYTOLOGY CVX/VAG DOC THIN PREP: NORMAL
DX ICD CODE: NORMAL
HIV 1 & 2 AB SER-IMP: NORMAL
HPV I/H RISK 1 DNA CVX QL PROBE+SIG AMP: NEGATIVE
OTHER STN SPEC: NORMAL
STAT OF ADQ CVX/VAG CYTO-IMP: NORMAL

## 2020-08-10 DIAGNOSIS — R11.0 NAUSEA: Primary | ICD-10-CM

## 2020-08-12 ENCOUNTER — APPOINTMENT (OUTPATIENT)
Dept: MAMMOGRAPHY | Facility: HOSPITAL | Age: 45
End: 2020-08-12

## 2020-08-14 ENCOUNTER — APPOINTMENT (OUTPATIENT)
Dept: MAMMOGRAPHY | Facility: HOSPITAL | Age: 45
End: 2020-08-14

## 2020-08-19 ENCOUNTER — HOSPITAL ENCOUNTER (OUTPATIENT)
Dept: MAMMOGRAPHY | Facility: HOSPITAL | Age: 45
Discharge: HOME | End: 2020-08-19
Admitting: OBSTETRICS & GYNECOLOGY

## 2020-08-19 DIAGNOSIS — Z13.9 SCREENING FOR CONDITION: ICD-10-CM

## 2020-08-19 PROCEDURE — 77067 SCR MAMMO BI INCL CAD: CPT

## 2020-08-19 PROCEDURE — 77063 BREAST TOMOSYNTHESIS BI: CPT

## 2020-09-04 ENCOUNTER — HOSPITAL ENCOUNTER (OUTPATIENT)
Dept: NUCLEAR MEDICINE | Facility: HOSPITAL | Age: 45
Discharge: HOME | End: 2020-09-04

## 2020-09-04 DIAGNOSIS — R11.0 NAUSEA: ICD-10-CM

## 2020-09-04 PROCEDURE — 78264 GASTRIC EMPTYING IMG STUDY: CPT

## 2020-09-04 PROCEDURE — 0 TECHNETIUM SULFUR COLLOID: Performed by: SURGERY

## 2020-09-04 PROCEDURE — A9541 TC99M SULFUR COLLOID: HCPCS | Performed by: SURGERY

## 2020-09-04 RX ADMIN — TECHNETIUM TC 99M SULFUR COLLOID 1 DOSE: KIT at 07:05

## 2021-04-09 ENCOUNTER — APPOINTMENT (OUTPATIENT)
Dept: CT IMAGING | Facility: HOSPITAL | Age: 46
End: 2021-04-09

## 2021-04-09 ENCOUNTER — HOSPITAL ENCOUNTER (EMERGENCY)
Facility: HOSPITAL | Age: 46
Discharge: HOME | End: 2021-04-09
Attending: EMERGENCY MEDICINE | Admitting: EMERGENCY MEDICINE

## 2021-04-09 VITALS
SYSTOLIC BLOOD PRESSURE: 123 MMHG | HEIGHT: 65 IN | DIASTOLIC BLOOD PRESSURE: 80 MMHG | TEMPERATURE: 98.5 F | OXYGEN SATURATION: 100 % | BODY MASS INDEX: 22.49 KG/M2 | WEIGHT: 135 LBS | HEART RATE: 103 BPM | RESPIRATION RATE: 18 BRPM

## 2021-04-09 DIAGNOSIS — K57.92 DIVERTICULITIS: Primary | ICD-10-CM

## 2021-04-09 LAB
ALBUMIN SERPL-MCNC: 4.1 G/DL (ref 3.5–5.2)
ALBUMIN/GLOB SERPL: 1.1 G/DL
ALP SERPL-CCNC: 73 U/L (ref 39–117)
ALT SERPL W P-5'-P-CCNC: 18 U/L (ref 1–33)
ANION GAP SERPL CALCULATED.3IONS-SCNC: 11.6 MMOL/L (ref 5–15)
AST SERPL-CCNC: 21 U/L (ref 1–32)
BASOPHILS # BLD AUTO: 0.03 10*3/MM3 (ref 0–0.2)
BASOPHILS NFR BLD AUTO: 0.4 % (ref 0–1.5)
BILIRUB SERPL-MCNC: 0.8 MG/DL (ref 0–1.2)
BILIRUB UR QL STRIP: NEGATIVE
BUN SERPL-MCNC: 5 MG/DL (ref 6–20)
BUN/CREAT SERPL: 5.7 (ref 7–25)
CALCIUM SPEC-SCNC: 9.7 MG/DL (ref 8.6–10.5)
CHLORIDE SERPL-SCNC: 100 MMOL/L (ref 98–107)
CLARITY UR: CLEAR
CO2 SERPL-SCNC: 24.4 MMOL/L (ref 22–29)
COLOR UR: YELLOW
CREAT SERPL-MCNC: 0.87 MG/DL (ref 0.57–1)
DEPRECATED RDW RBC AUTO: 41.6 FL (ref 37–54)
EOSINOPHIL # BLD AUTO: 0.05 10*3/MM3 (ref 0–0.4)
EOSINOPHIL NFR BLD AUTO: 0.6 % (ref 0.3–6.2)
ERYTHROCYTE [DISTWIDTH] IN BLOOD BY AUTOMATED COUNT: 11.9 % (ref 12.3–15.4)
GFR SERPL CREATININE-BSD FRML MDRD: 70 ML/MIN/1.73
GLOBULIN UR ELPH-MCNC: 3.9 GM/DL
GLUCOSE SERPL-MCNC: 148 MG/DL (ref 65–99)
GLUCOSE UR STRIP-MCNC: NEGATIVE MG/DL
HCG SERPL QL: NEGATIVE
HCT VFR BLD AUTO: 45 % (ref 34–46.6)
HGB BLD-MCNC: 14.5 G/DL (ref 12–15.9)
HGB UR QL STRIP.AUTO: NEGATIVE
IMM GRANULOCYTES # BLD AUTO: 0.03 10*3/MM3 (ref 0–0.05)
IMM GRANULOCYTES NFR BLD AUTO: 0.4 % (ref 0–0.5)
KETONES UR QL STRIP: NEGATIVE
LEUKOCYTE ESTERASE UR QL STRIP.AUTO: NEGATIVE
LIPASE SERPL-CCNC: 19 U/L (ref 13–60)
LYMPHOCYTES # BLD AUTO: 0.88 10*3/MM3 (ref 0.7–3.1)
LYMPHOCYTES NFR BLD AUTO: 10.8 % (ref 19.6–45.3)
MCH RBC QN AUTO: 30.5 PG (ref 26.6–33)
MCHC RBC AUTO-ENTMCNC: 32.2 G/DL (ref 31.5–35.7)
MCV RBC AUTO: 94.7 FL (ref 79–97)
MONOCYTES # BLD AUTO: 0.54 10*3/MM3 (ref 0.1–0.9)
MONOCYTES NFR BLD AUTO: 6.6 % (ref 5–12)
NEUTROPHILS NFR BLD AUTO: 6.6 10*3/MM3 (ref 1.7–7)
NEUTROPHILS NFR BLD AUTO: 81.2 % (ref 42.7–76)
NITRITE UR QL STRIP: NEGATIVE
NRBC BLD AUTO-RTO: 0 /100 WBC (ref 0–0.2)
PH UR STRIP.AUTO: 7.5 [PH] (ref 4.5–8)
PLATELET # BLD AUTO: 256 10*3/MM3 (ref 140–450)
PMV BLD AUTO: 9.1 FL (ref 6–12)
POTASSIUM SERPL-SCNC: 3.9 MMOL/L (ref 3.5–5.2)
PROT SERPL-MCNC: 8 G/DL (ref 6–8.5)
PROT UR QL STRIP: NEGATIVE
RBC # BLD AUTO: 4.75 10*6/MM3 (ref 3.77–5.28)
SODIUM SERPL-SCNC: 136 MMOL/L (ref 136–145)
SP GR UR STRIP: 1.02 (ref 1–1.03)
UROBILINOGEN UR QL STRIP: NORMAL
WBC # BLD AUTO: 8.13 10*3/MM3 (ref 3.4–10.8)

## 2021-04-09 PROCEDURE — 25010000002 ONDANSETRON PER 1 MG: Performed by: EMERGENCY MEDICINE

## 2021-04-09 PROCEDURE — 96375 TX/PRO/DX INJ NEW DRUG ADDON: CPT

## 2021-04-09 PROCEDURE — 0 IOPAMIDOL PER 1 ML: Performed by: EMERGENCY MEDICINE

## 2021-04-09 PROCEDURE — 85025 COMPLETE CBC W/AUTO DIFF WBC: CPT | Performed by: EMERGENCY MEDICINE

## 2021-04-09 PROCEDURE — 84703 CHORIONIC GONADOTROPIN ASSAY: CPT | Performed by: EMERGENCY MEDICINE

## 2021-04-09 PROCEDURE — 80053 COMPREHEN METABOLIC PANEL: CPT | Performed by: EMERGENCY MEDICINE

## 2021-04-09 PROCEDURE — 74177 CT ABD & PELVIS W/CONTRAST: CPT

## 2021-04-09 PROCEDURE — 83690 ASSAY OF LIPASE: CPT | Performed by: EMERGENCY MEDICINE

## 2021-04-09 PROCEDURE — 81003 URINALYSIS AUTO W/O SCOPE: CPT | Performed by: EMERGENCY MEDICINE

## 2021-04-09 PROCEDURE — 99283 EMERGENCY DEPT VISIT LOW MDM: CPT | Performed by: PHYSICIAN ASSISTANT

## 2021-04-09 PROCEDURE — 96374 THER/PROPH/DIAG INJ IV PUSH: CPT

## 2021-04-09 PROCEDURE — 99283 EMERGENCY DEPT VISIT LOW MDM: CPT

## 2021-04-09 PROCEDURE — 25010000002 HYDROMORPHONE PER 4 MG: Performed by: EMERGENCY MEDICINE

## 2021-04-09 RX ORDER — HYDROMORPHONE HCL 110MG/55ML
0.5 PATIENT CONTROLLED ANALGESIA SYRINGE INTRAVENOUS ONCE
Status: COMPLETED | OUTPATIENT
Start: 2021-04-09 | End: 2021-04-09

## 2021-04-09 RX ORDER — OXYCODONE HYDROCHLORIDE AND ACETAMINOPHEN 5; 325 MG/1; MG/1
1 TABLET ORAL ONCE
Status: COMPLETED | OUTPATIENT
Start: 2021-04-09 | End: 2021-04-09

## 2021-04-09 RX ORDER — ONDANSETRON 2 MG/ML
4 INJECTION INTRAMUSCULAR; INTRAVENOUS ONCE
Status: COMPLETED | OUTPATIENT
Start: 2021-04-09 | End: 2021-04-09

## 2021-04-09 RX ORDER — CIPROFLOXACIN 500 MG/1
500 TABLET, FILM COATED ORAL 2 TIMES DAILY
Qty: 20 TABLET | Refills: 0 | Status: SHIPPED | OUTPATIENT
Start: 2021-04-09 | End: 2021-04-19

## 2021-04-09 RX ORDER — METRONIDAZOLE 500 MG/1
500 TABLET ORAL 3 TIMES DAILY
Qty: 30 TABLET | Refills: 0 | Status: SHIPPED | OUTPATIENT
Start: 2021-04-09 | End: 2021-04-19

## 2021-04-09 RX ADMIN — ONDANSETRON 4 MG: 2 INJECTION INTRAMUSCULAR; INTRAVENOUS at 15:43

## 2021-04-09 RX ADMIN — HYDROMORPHONE HYDROCHLORIDE 0.5 MG: 2 INJECTION INTRAMUSCULAR; INTRAVENOUS; SUBCUTANEOUS at 15:44

## 2021-04-09 RX ADMIN — OXYCODONE AND ACETAMINOPHEN 1 TABLET: 5; 325 TABLET ORAL at 17:22

## 2021-04-09 RX ADMIN — IOPAMIDOL 100 ML: 755 INJECTION, SOLUTION INTRAVENOUS at 15:37

## 2021-04-09 NOTE — ED NOTES
Pt stated she has a h/o of diverticulitis and and a recent dx of gastroparesis for which she is receiving infusions.  She said she saw her PCP today and was sent to ER for further testing for r/o obstruction     Yola Morrow RN  04/09/21 [id number]

## 2021-04-09 NOTE — ED NOTES
Pt arrived by PV. States abd pain starting x2 days ago, increase over the past several days.   At PCP today and UA negative.     Patient was placed in face mask during first look triage.  Patient was wearing a face mask throughout encounter.  I wore personal protective equipment throughout the encounter.  Hand hygiene was performed before and after patient encounter.        Fatuma Villalobos, RN  04/09/21 [id number]

## 2021-04-09 NOTE — ED PROVIDER NOTES
EMERGENCY DEPARTMENT ENCOUNTER      Room Number: 09/09    History is provided by the patient, no translation services needed    HPI:    Chief complaint: Abdominal pain    Location: Left lower quadrant    Quality/Severity: Moderate to severe    Timing/Duration: 3 days    Modifying Factors: Nothing seems to help her pain, pain worsens with movement.    Associated Symptoms: Positive for abdominal pain.  Chronic nausea and constipation.  Patient denies any vaginal discharge or concerns for STDs, fever, chills, chest pain, shortness of breath.    Narrative: Pt is a 45 y.o. female who presents complaining of abdominal pain in left lower quadrant for the past 3 days.  She states she has a PMH significant for diverticulitis, and gastroparesis for which she receives weekly IVIG infusions.  She states she saw her PCP today had a normal urinalysis and was sent here for further evaluation because they were concerned for diverticulitis.  She states she also has a history of ectopic pregnancy and has had bilateral salpingectomy and hysterectomy but still has both of her ovaries remaining.      PMD: Victorino Wolfe,     REVIEW OF SYSTEMS  Review of Systems   Constitutional: Negative for chills and fever.   Respiratory: Negative for cough and shortness of breath.    Cardiovascular: Negative for chest pain and palpitations.   Gastrointestinal: Positive for abdominal pain and nausea (Chronic). Negative for vomiting.   Genitourinary: Negative for difficulty urinating and dysuria.   Musculoskeletal: Negative for arthralgias and myalgias.   Skin: Negative for rash and wound.   Neurological: Negative for dizziness and syncope.   Psychiatric/Behavioral: Negative for confusion. The patient is not nervous/anxious.          PAST MEDICAL HISTORY  Active Ambulatory Problems     Diagnosis Date Noted   • DUB (dysfunctional uterine bleeding) 11/28/2016   • Loss of appetite 11/01/2017   • Nausea 11/01/2017   • Bloating 11/01/2017   • LLQ pain  11/01/2017   • Diarrhea 11/01/2017   • Generalized abdominal pain 06/05/2020   • History of gastric polyp 06/05/2020   • History of colon polyps 06/05/2020     Resolved Ambulatory Problems     Diagnosis Date Noted   • No Resolved Ambulatory Problems     Past Medical History:   Diagnosis Date   • Anemia    • Diverticulitis    • GERD (gastroesophageal reflux disease)    • Hiatal hernia    • Menstrual abnormality    • Migraine    • Ulcer of abdomen wall (CMS/HCC)    • UTI (urinary tract infection)        PAST SURGICAL HISTORY  Past Surgical History:   Procedure Laterality Date   • CHOLECYSTECTOMY N/A 9/23/2016    Procedure: CHOLECYSTECTOMY LAPAROSCOPIC;  Surgeon: Cordell Dejesus MD;  Location: [facility] OR;  Service:    • COLONOSCOPY N/A 11/14/2017    Procedure: COLONOSCOPY,   polypectomy ;  Surgeon: Anca Kay DO;  Location: [facility] OR;  Service:    • COLONOSCOPY N/A 6/23/2020    Procedure: Colonoscopy with possible biopsy or polypectomy;  Surgeon: Anca Kay DO;  Location: [facility] OR;  Service: Gastroenterology;  Laterality: N/A;  SIGMOID POLYP  HEPATIC FLEXURE POLYP  DIVERTICULOSIS     • D & C HYSTEROSCOPY MYOSURE N/A 8/10/2016    Procedure: DILATATION AND CURETTAGE HYSTEROSCOPY ;  Surgeon: Frank Alvarado MD;  Location: [facility] OR;  Service:    • DIAGNOSTIC LAPAROSCOPY     • ECTOPIC PREGNANCY SURGERY     • ENDOSCOPY N/A 11/14/2017    Procedure: ESOPHAGOGASTRODUODENOSCOPY  with gastric biopsy for  SHERI TEST, POLYPECTOMY ;  Surgeon: Anca Kay DO;  Location: [facility] OR;  Service:    • ENDOSCOPY N/A 6/23/2020    Procedure: Esophagogastroduodenoscopy with possible biopsy, polypectomy, dilation;  Surgeon: Anca Kay DO;  Location: [facility] OR;  Service: Gastroenterology;  Laterality: N/A;  SHERI TEST  HIATAL HERNIA  GE JUNCTION BIOPSY   • FOOT SURGERY Right     x3   • HYSTERECTOMY     • DC LAP, RADICAL HYST W/ TUBE&OV, NODE BX N/A 11/28/2016    Procedure: TOTAL LAPAROSCOPIC  HYSTERECTOMY, bilateral salpingectomy, ablation of peritoneal endometriosis;  Surgeon: Frank Alvarado MD;  Location: [facility];  Service: Obstetrics/Gynecology   • TUBAL ABDOMINAL LIGATION         FAMILY HISTORY  Family History   Problem Relation Age of Onset   • Hypertension Mother    • Cancer Father    • Hypertension Father    • Breast cancer Cousin        SOCIAL HISTORY  Social History     Socioeconomic History   • Marital status: Single     Spouse name: Not on file   • Number of children: Not on file   • Years of education: Not on file   • Highest education level: Not on file   Tobacco Use   • Smoking status: Former Smoker     Packs/day: 1.50   • Smokeless tobacco: Never Used   Vaping Use   • Vaping Use: Every day   • Substances: Nicotine, Flavoring   • Devices: Refillable tank   • Passive vaping exposure Yes   Substance and Sexual Activity   • Alcohol use: Yes     Comment: rarely   • Drug use: No   • Sexual activity: Defer       ALLERGIES  Patient has no known allergies.      Current Facility-Administered Medications:   •  oxyCODONE-acetaminophen (PERCOCET) 5-325 MG per tablet 1 tablet, 1 tablet, Oral, Once, Bay Gagnon MD    Current Outpatient Medications:   •  Immune Globulin, Human,-ifas (PANZYGA IV), Infuse  into a venous catheter., Disp: , Rfl:   •  linaCLOtide (LINZESS PO), Take  by mouth., Disp: , Rfl:   •  omeprazole (priLOSEC) 20 MG capsule, Take 40 mg by mouth Daily., Disp: , Rfl:   •  SUMAtriptan (IMITREX) 100 MG tablet, TAKE ONE TABLET BY MOUTH AT ONSET OF HEADACHE; MAY REPEAT ONE TABLET IN 2 HOURS IF NEEDED. DO NOT EXCEED 2 TABLETS IN 24 HOURS, Disp: , Rfl:   •  topiramate (TOPAMAX) 100 MG tablet, Take 100 mg by mouth., Disp: , Rfl:   •  ciprofloxacin (CIPRO) 500 MG tablet, Take 1 tablet by mouth 2 (Two) Times a Day for 10 days., Disp: 20 tablet, Rfl: 0  •  metroNIDAZOLE (FLAGYL) 500 MG tablet, Take 1 tablet by mouth 3 (Three) Times a Day for 10 days., Disp: 30 tablet, Rfl:  0    PHYSICAL EXAM  ED Triage Vitals [04/09/21 1435]   Temp Heart Rate Resp BP SpO2   98.5 °F (36.9 °C) 103 18 136/90 98 %      Temp src Heart Rate Source Patient Position BP Location FiO2 (%)   Oral Monitor Sitting Left arm --       Physical Exam  Vitals and nursing note reviewed.   Constitutional:       General: She is not in acute distress.     Appearance: She is normal weight. She is not ill-appearing.   HENT:      Head: Normocephalic and atraumatic.   Eyes:      Conjunctiva/sclera: Conjunctivae normal.      Pupils: Pupils are equal, round, and reactive to light.   Cardiovascular:      Rate and Rhythm: Normal rate and regular rhythm.   Pulmonary:      Effort: Pulmonary effort is normal.      Breath sounds: Normal breath sounds.   Abdominal:      General: Bowel sounds are normal.      Palpations: Abdomen is soft.      Tenderness: There is abdominal tenderness in the left lower quadrant. There is guarding. There is no rebound.   Musculoskeletal:         General: Normal range of motion.      Cervical back: Normal range of motion and neck supple.   Skin:     General: Skin is warm and dry.      Capillary Refill: Capillary refill takes less than 2 seconds.   Neurological:      Mental Status: She is alert and oriented to person, place, and time.   Psychiatric:         Mood and Affect: Mood and affect normal.         Cognition and Memory: Memory normal.         Judgment: Judgment normal.           LAB RESULTS  Lab Results (last 24 hours)     Procedure Component Value Units Date/Time    CBC & Differential [[id number]]  (Abnormal) Collected: 04/09/21 1455    Specimen: Blood Updated: 04/09/21 1506    Narrative:      The following orders were created for panel order CBC & Differential.  Procedure                               Abnormality         Status                     ---------                               -----------         ------                     CBC Auto Differential[[id number]]        Abnormal            Final  result                 Please view results for these tests on the individual orders.    Comprehensive Metabolic Panel [[id number]]  (Abnormal) Collected: 04/09/21 1455    Specimen: Blood Updated: 04/09/21 1522     Glucose 148 mg/dL      BUN 5 mg/dL      Creatinine 0.87 mg/dL      Sodium 136 mmol/L      Potassium 3.9 mmol/L      Chloride 100 mmol/L      CO2 24.4 mmol/L      Calcium 9.7 mg/dL      Total Protein 8.0 g/dL      Albumin 4.10 g/dL      ALT (SGPT) 18 U/L      AST (SGOT) 21 U/L      Alkaline Phosphatase 73 U/L      Total Bilirubin 0.8 mg/dL      eGFR Non African Amer 70 mL/min/1.73      Globulin 3.9 gm/dL      A/G Ratio 1.1 g/dL      BUN/Creatinine Ratio 5.7     Anion Gap 11.6 mmol/L     Narrative:      GFR Normal >60  Chronic Kidney Disease <60  Kidney Failure <15      hCG, Serum, Qualitative [[id number]]  (Normal) Collected: 04/09/21 1455    Specimen: Blood Updated: 04/09/21 1516     HCG Qualitative Negative    Urinalysis With Culture If Indicated - Urine, Clean Catch [[id number]]  (Normal) Collected: 04/09/21 1455    Specimen: Urine, Clean Catch Updated: 04/09/21 1504     Color, UA Yellow     Appearance, UA Clear     pH, UA 7.5     Specific Gravity, UA 1.025     Glucose, UA Negative     Ketones, UA Negative     Bilirubin, UA Negative     Blood, UA Negative     Protein, UA Negative     Leuk Esterase, UA Negative     Nitrite, UA Negative     Urobilinogen, UA 0.2 E.U./dL    Narrative:      Urine microscopic not indicated.    Lipase [[id number]]  (Normal) Collected: 04/09/21 1455    Specimen: Blood Updated: 04/09/21 1522     Lipase 19 U/L     CBC Auto Differential [[id number]]  (Abnormal) Collected: 04/09/21 1455    Specimen: Blood Updated: 04/09/21 1503     WBC 8.13 10*3/mm3      RBC 4.75 10*6/mm3      Hemoglobin 14.5 g/dL      Hematocrit 45.0 %      MCV 94.7 fL      MCH 30.5 pg      MCHC 32.2 g/dL      RDW 11.9 %      RDW-SD 41.6 fl      MPV 9.1 fL      Platelets 256 10*3/mm3      Neutrophil % 81.2 %       Lymphocyte % 10.8 %      Monocyte % 6.6 %      Eosinophil % 0.6 %      Basophil % 0.4 %      Immature Grans % 0.4 %      Neutrophils, Absolute 6.60 10*3/mm3      Lymphocytes, Absolute 0.88 10*3/mm3      Monocytes, Absolute 0.54 10*3/mm3      Eosinophils, Absolute 0.05 10*3/mm3      Basophils, Absolute 0.03 10*3/mm3      Immature Grans, Absolute 0.03 10*3/mm3      nRBC 0.0 /100 WBC             I ordered the above labs and reviewed the results    RADIOLOGY  CT Abdomen Pelvis With Contrast    Result Date: 4/9/2021  CT Abdomen Pelvis W INDICATION: Severe left lower quadrant pain for 3 days. History of diverticulitis. Prior cholecystectomy, hysterectomy. TECHNIQUE: CT of the abdomen and pelvis with Isovue-370 IV contrast. Coronal and sagittal reconstructions were obtained.  Radiation dose reduction techniques included automated exposure control or exposure modulation based on body size. Count of known CT and cardiac nuc med studies performed in previous 12 months: 0. COMPARISON: CT abdomen pelvis 2/25/2019 FINDINGS: Abdomen: Lung bases unremarkable. Multiple low-attenuation nonenhancing liver lesions ranging in size from less than 5 mm up to 1.8 cm compatible cysts. Post cholecystectomy. Spleen unremarkable. Pancreas, kidneys and adrenal glands appear normal. Stomach and small bowel appears normal. No free air or free fluid. Sigmoid wall thickening and mild perisigmoid inflammatory change. In the setting of diverticular changes likely reflects acute diverticulitis. No perforation or abscess. No obstruction. Pelvis: Bladder unremarkable. Uterus surgically absent. Osseous structures and soft tissues appear normal.     Sigmoid wall thickening involving approximately 4.2 cm segment of the sigmoid colon within the left pelvis with surrounding inflammatory changes and in the setting of diverticular disease compatible with acute diverticulitis. No perforation, abscess or obstruction. Multiple low-attenuation liver lesions  compatible with cysts. Signer Name: HERNANDEZ Wu MD  Signed: 4/9/2021 3:52 PM  Workstation Name: [id number]  [facility]      I ordered the above radiologic testing and reviewed the results    PROCEDURES  Procedures      PROGRESS AND CONSULTS  ED Course as of Apr 09 1705 Fri Apr 09, 2021 1700 Discussed lab and imaging findings with patient.  Her lab work here is within normal limits, her CT shows diverticulitis without any significant complications.  She states she actually only has a history of diverticulosis and has never had an episode of diverticulitis in the past.  Her pain improved after Dilaudid but is starting to increase again.  We will give her a Percocet here prior to discharge.  I am hesitant to put her on any narcotics at home since she already has fairly severe issues with constipation.  She agrees that she does not want to be on any narcotics at home.  We will treat her with Cipro and Flagyl.  She is followed by Dr. Kay who has done her colonoscopies in the past.  Discussed that she could follow-up with her if she has any further issues with this episode of diverticulitis.  Also discussed return to ER warnings.  Patient verbalizes understanding and is agreeable with plan for discharge at this time.    [KS]      ED Course User Index  [KS] Michelle Maya, VARGHESE           MEDICAL DECISION MAKING    MDM        My differential diagnosis for abdominal pain includes but is not limited to:  Gastritis, gastroenteritis, peptic ulcer disease, GERD, esophageal perforation, acute appendicitis, mesenteric adenitis, Meckel’s diverticulum, epiploic appendagitis, diverticulitis, colon cancer, ulcerative colitis, Crohn’s disease, intussusception, small bowel obstruction, adhesions, ischemic bowel, perforated viscus, ileus, obstipation, biliary colic, cholecystitis, cholelithiasis, Von-Roberto Abner, hepatitis, pancreatitis, common bile duct obstruction, cholangitis, bile leak,  splenic trauma, splenic rupture, splenic infarction, splenic abscess, abdominal abscess, ascites, spontaneous bacterial peritonitis, hernia, UTI, cystitis,ureterolithiasis, urinary obstruction, ovarian cyst, torsion, pregnancy, ectopic pregnancy, PID, pelvic abscess, mittelschmerz, endometriosis, AAA, myocardial infarction, pneumonia, cancer, porphyria, DKA, medications, sickle cell, viral syndrome, zoster      DIAGNOSIS  Final diagnoses:   Diverticulitis       Latest Documented Vital Signs:  As of 17:05 EDT  BP- 123/80 HR- 103 Temp- 98.5 °F (36.9 °C) (Oral) O2 sat- 100%    DISPOSITION  Patient discharged home in care of her mother.    Discussed pertinent findings with the patient/family.  Patient/Family voiced understanding of need to follow-up for recheck and further testing as needed.  Return to the Emergency Department warnings were given.         Medication List      New Prescriptions    ciprofloxacin 500 MG tablet  Commonly known as: CIPRO  Take 1 tablet by mouth 2 (Two) Times a Day for 10 days.     metroNIDAZOLE 500 MG tablet  Commonly known as: FLAGYL  Take 1 tablet by mouth 3 (Three) Times a Day for 10 days.           Where to Get Your Medications      These medications were sent to [place] [address] [address]  &  - [phone]  - [phone] [place] [id number]    Phone: [phone]   · ciprofloxacin 500 MG tablet  · metroNIDAZOLE 500 MG tablet             Follow-up Information     Call  Anca Kay DO.    Specialty: General Surgery  Why: As needed  Contact information:  [address]  [place] [id number] [phone]                     Dictated utilizing Dragon dictation     Michelle Maya PA-C  04/09/21 9743

## 2021-04-12 ENCOUNTER — HOSPITAL ENCOUNTER (OUTPATIENT)
Facility: HOSPITAL | Age: 46
Setting detail: OBSERVATION
Discharge: HOME | End: 2021-04-13
Attending: EMERGENCY MEDICINE | Admitting: INTERNAL MEDICINE

## 2021-04-12 ENCOUNTER — TELEPHONE (OUTPATIENT)
Dept: SURGERY | Facility: CLINIC | Age: 46
End: 2021-04-12

## 2021-04-12 ENCOUNTER — APPOINTMENT (OUTPATIENT)
Dept: GENERAL RADIOLOGY | Facility: HOSPITAL | Age: 46
End: 2021-04-12

## 2021-04-12 DIAGNOSIS — K57.92 DIVERTICULITIS: ICD-10-CM

## 2021-04-12 DIAGNOSIS — R10.30 LOWER ABDOMINAL PAIN: Primary | ICD-10-CM

## 2021-04-12 LAB
ALBUMIN SERPL-MCNC: 3.8 G/DL (ref 3.5–5.2)
ALBUMIN/GLOB SERPL: 0.8 G/DL
ALP SERPL-CCNC: 70 U/L (ref 39–117)
ALT SERPL W P-5'-P-CCNC: 14 U/L (ref 1–33)
ANION GAP SERPL CALCULATED.3IONS-SCNC: 10.7 MMOL/L (ref 5–15)
AST SERPL-CCNC: 20 U/L (ref 1–32)
BACTERIA UR QL AUTO: ABNORMAL /HPF
BASOPHILS # BLD AUTO: 0.04 10*3/MM3 (ref 0–0.2)
BASOPHILS NFR BLD AUTO: 0.5 % (ref 0–1.5)
BILIRUB SERPL-MCNC: 0.7 MG/DL (ref 0–1.2)
BILIRUB UR QL STRIP: NEGATIVE
BUN SERPL-MCNC: 7 MG/DL (ref 6–20)
BUN/CREAT SERPL: 7.8 (ref 7–25)
CALCIUM SPEC-SCNC: 9.5 MG/DL (ref 8.6–10.5)
CHLORIDE SERPL-SCNC: 102 MMOL/L (ref 98–107)
CLARITY UR: CLEAR
CO2 SERPL-SCNC: 22.3 MMOL/L (ref 22–29)
COD CRY URNS QL: ABNORMAL /HPF
COLOR UR: YELLOW
CREAT SERPL-MCNC: 0.9 MG/DL (ref 0.57–1)
DEPRECATED RDW RBC AUTO: 39.6 FL (ref 37–54)
EOSINOPHIL # BLD AUTO: 0.07 10*3/MM3 (ref 0–0.4)
EOSINOPHIL NFR BLD AUTO: 0.9 % (ref 0.3–6.2)
ERYTHROCYTE [DISTWIDTH] IN BLOOD BY AUTOMATED COUNT: 11.8 % (ref 12.3–15.4)
GFR SERPL CREATININE-BSD FRML MDRD: 67 ML/MIN/1.73
GLOBULIN UR ELPH-MCNC: 4.5 GM/DL
GLUCOSE SERPL-MCNC: 117 MG/DL (ref 65–99)
GLUCOSE UR STRIP-MCNC: NEGATIVE MG/DL
HCT VFR BLD AUTO: 42.8 % (ref 34–46.6)
HGB BLD-MCNC: 14.2 G/DL (ref 12–15.9)
HGB UR QL STRIP.AUTO: NEGATIVE
HOLD SPECIMEN: NORMAL
HOLD SPECIMEN: NORMAL
HYALINE CASTS UR QL AUTO: ABNORMAL /LPF
IMM GRANULOCYTES # BLD AUTO: 0.02 10*3/MM3 (ref 0–0.05)
IMM GRANULOCYTES NFR BLD AUTO: 0.3 % (ref 0–0.5)
KETONES UR QL STRIP: NEGATIVE
LEUKOCYTE ESTERASE UR QL STRIP.AUTO: ABNORMAL
LIPASE SERPL-CCNC: 17 U/L (ref 13–60)
LYMPHOCYTES # BLD AUTO: 0.81 10*3/MM3 (ref 0.7–3.1)
LYMPHOCYTES NFR BLD AUTO: 10.8 % (ref 19.6–45.3)
MCH RBC QN AUTO: 30.8 PG (ref 26.6–33)
MCHC RBC AUTO-ENTMCNC: 33.2 G/DL (ref 31.5–35.7)
MCV RBC AUTO: 92.8 FL (ref 79–97)
MONOCYTES # BLD AUTO: 0.58 10*3/MM3 (ref 0.1–0.9)
MONOCYTES NFR BLD AUTO: 7.7 % (ref 5–12)
NEUTROPHILS NFR BLD AUTO: 5.97 10*3/MM3 (ref 1.7–7)
NEUTROPHILS NFR BLD AUTO: 79.8 % (ref 42.7–76)
NITRITE UR QL STRIP: NEGATIVE
NRBC BLD AUTO-RTO: 0 /100 WBC (ref 0–0.2)
PH UR STRIP.AUTO: 5.5 [PH] (ref 4.5–8)
PLATELET # BLD AUTO: 277 10*3/MM3 (ref 140–450)
PMV BLD AUTO: 9 FL (ref 6–12)
POTASSIUM SERPL-SCNC: 3.7 MMOL/L (ref 3.5–5.2)
PROT SERPL-MCNC: 8.3 G/DL (ref 6–8.5)
PROT UR QL STRIP: NEGATIVE
RBC # BLD AUTO: 4.61 10*6/MM3 (ref 3.77–5.28)
RBC # UR: ABNORMAL /HPF
REF LAB TEST METHOD: ABNORMAL
SARS-COV-2 RNA PNL SPEC NAA+PROBE: NOT DETECTED
SODIUM SERPL-SCNC: 135 MMOL/L (ref 136–145)
SP GR UR STRIP: 1.02 (ref 1–1.03)
SQUAMOUS #/AREA URNS HPF: ABNORMAL /HPF
UROBILINOGEN UR QL STRIP: ABNORMAL
WBC # BLD AUTO: 7.49 10*3/MM3 (ref 3.4–10.8)
WBC UR QL AUTO: ABNORMAL /HPF
WHOLE BLOOD HOLD SPECIMEN: NORMAL
WHOLE BLOOD HOLD SPECIMEN: NORMAL

## 2021-04-12 PROCEDURE — 94799 UNLISTED PULMONARY SVC/PX: CPT

## 2021-04-12 PROCEDURE — 81001 URINALYSIS AUTO W/SCOPE: CPT | Performed by: EMERGENCY MEDICINE

## 2021-04-12 PROCEDURE — 96376 TX/PRO/DX INJ SAME DRUG ADON: CPT

## 2021-04-12 PROCEDURE — 25010000002 FENTANYL CITRATE (PF) 100 MCG/2ML SOLUTION: Performed by: EMERGENCY MEDICINE

## 2021-04-12 PROCEDURE — G0378 HOSPITAL OBSERVATION PER HR: HCPCS

## 2021-04-12 PROCEDURE — 83690 ASSAY OF LIPASE: CPT | Performed by: EMERGENCY MEDICINE

## 2021-04-12 PROCEDURE — 99284 EMERGENCY DEPT VISIT MOD MDM: CPT

## 2021-04-12 PROCEDURE — 96374 THER/PROPH/DIAG INJ IV PUSH: CPT

## 2021-04-12 PROCEDURE — 99219 PR INITIAL OBSERVATION CARE/DAY 50 MINUTES: CPT | Performed by: INTERNAL MEDICINE

## 2021-04-12 PROCEDURE — C9803 HOPD COVID-19 SPEC COLLECT: HCPCS

## 2021-04-12 PROCEDURE — 85025 COMPLETE CBC W/AUTO DIFF WBC: CPT | Performed by: EMERGENCY MEDICINE

## 2021-04-12 PROCEDURE — 96361 HYDRATE IV INFUSION ADD-ON: CPT

## 2021-04-12 PROCEDURE — 99285 EMERGENCY DEPT VISIT HI MDM: CPT | Performed by: EMERGENCY MEDICINE

## 2021-04-12 PROCEDURE — 25010000002 KETOROLAC TROMETHAMINE PER 15 MG: Performed by: INTERNAL MEDICINE

## 2021-04-12 PROCEDURE — 80053 COMPREHEN METABOLIC PANEL: CPT | Performed by: EMERGENCY MEDICINE

## 2021-04-12 PROCEDURE — 74018 RADEX ABDOMEN 1 VIEW: CPT

## 2021-04-12 PROCEDURE — 36415 COLL VENOUS BLD VENIPUNCTURE: CPT

## 2021-04-12 PROCEDURE — 87635 SARS-COV-2 COVID-19 AMP PRB: CPT | Performed by: EMERGENCY MEDICINE

## 2021-04-12 PROCEDURE — 25010000002 ONDANSETRON PER 1 MG: Performed by: EMERGENCY MEDICINE

## 2021-04-12 PROCEDURE — 96375 TX/PRO/DX INJ NEW DRUG ADDON: CPT

## 2021-04-12 PROCEDURE — 25010000002 METOCLOPRAMIDE PER 10 MG: Performed by: INTERNAL MEDICINE

## 2021-04-12 RX ORDER — FENTANYL CITRATE 50 UG/ML
50 INJECTION, SOLUTION INTRAMUSCULAR; INTRAVENOUS ONCE
Status: COMPLETED | OUTPATIENT
Start: 2021-04-12 | End: 2021-04-12

## 2021-04-12 RX ORDER — ONDANSETRON 4 MG/1
4 TABLET, FILM COATED ORAL EVERY 6 HOURS PRN
Status: DISCONTINUED | OUTPATIENT
Start: 2021-04-12 | End: 2021-04-13 | Stop reason: HOSPADM

## 2021-04-12 RX ORDER — ONDANSETRON 2 MG/ML
4 INJECTION INTRAMUSCULAR; INTRAVENOUS ONCE
Status: COMPLETED | OUTPATIENT
Start: 2021-04-12 | End: 2021-04-12

## 2021-04-12 RX ORDER — METOCLOPRAMIDE HYDROCHLORIDE 5 MG/ML
5 INJECTION INTRAMUSCULAR; INTRAVENOUS EVERY 8 HOURS
Status: DISCONTINUED | OUTPATIENT
Start: 2021-04-12 | End: 2021-04-13 | Stop reason: HOSPADM

## 2021-04-12 RX ORDER — SODIUM CHLORIDE 9 MG/ML
40 INJECTION, SOLUTION INTRAVENOUS AS NEEDED
Status: DISCONTINUED | OUTPATIENT
Start: 2021-04-12 | End: 2021-04-13 | Stop reason: HOSPADM

## 2021-04-12 RX ORDER — ACETAMINOPHEN 160 MG/5ML
650 SOLUTION ORAL EVERY 4 HOURS PRN
Status: DISCONTINUED | OUTPATIENT
Start: 2021-04-12 | End: 2021-04-13 | Stop reason: HOSPADM

## 2021-04-12 RX ORDER — SODIUM CHLORIDE 9 MG/ML
100 INJECTION, SOLUTION INTRAVENOUS CONTINUOUS
Status: DISCONTINUED | OUTPATIENT
Start: 2021-04-12 | End: 2021-04-13 | Stop reason: HOSPADM

## 2021-04-12 RX ORDER — PANTOPRAZOLE SODIUM 40 MG/1
40 TABLET, DELAYED RELEASE ORAL EVERY MORNING
Status: DISCONTINUED | OUTPATIENT
Start: 2021-04-13 | End: 2021-04-13 | Stop reason: HOSPADM

## 2021-04-12 RX ORDER — HYDROCODONE BITARTRATE AND ACETAMINOPHEN 5; 325 MG/1; MG/1
1 TABLET ORAL EVERY 4 HOURS PRN
Status: DISCONTINUED | OUTPATIENT
Start: 2021-04-12 | End: 2021-04-13 | Stop reason: HOSPADM

## 2021-04-12 RX ORDER — TOPIRAMATE 25 MG/1
100 TABLET ORAL 2 TIMES DAILY
Status: DISCONTINUED | OUTPATIENT
Start: 2021-04-12 | End: 2021-04-13 | Stop reason: HOSPADM

## 2021-04-12 RX ORDER — SODIUM CHLORIDE 0.9 % (FLUSH) 0.9 %
10 SYRINGE (ML) INJECTION AS NEEDED
Status: DISCONTINUED | OUTPATIENT
Start: 2021-04-12 | End: 2021-04-13 | Stop reason: HOSPADM

## 2021-04-12 RX ORDER — ACETAMINOPHEN 650 MG/1
650 SUPPOSITORY RECTAL EVERY 4 HOURS PRN
Status: DISCONTINUED | OUTPATIENT
Start: 2021-04-12 | End: 2021-04-13 | Stop reason: HOSPADM

## 2021-04-12 RX ORDER — POLYETHYLENE GLYCOL 3350 17 G/17G
17 POWDER, FOR SOLUTION ORAL DAILY
Status: DISCONTINUED | OUTPATIENT
Start: 2021-04-12 | End: 2021-04-13 | Stop reason: HOSPADM

## 2021-04-12 RX ORDER — ONDANSETRON 2 MG/ML
4 INJECTION INTRAMUSCULAR; INTRAVENOUS EVERY 6 HOURS PRN
Status: DISCONTINUED | OUTPATIENT
Start: 2021-04-12 | End: 2021-04-13 | Stop reason: HOSPADM

## 2021-04-12 RX ORDER — KETOROLAC TROMETHAMINE 30 MG/ML
15 INJECTION, SOLUTION INTRAMUSCULAR; INTRAVENOUS EVERY 6 HOURS PRN
Status: DISCONTINUED | OUTPATIENT
Start: 2021-04-12 | End: 2021-04-13 | Stop reason: HOSPADM

## 2021-04-12 RX ORDER — LEVOFLOXACIN 750 MG/1
750 TABLET ORAL
Status: DISCONTINUED | OUTPATIENT
Start: 2021-04-12 | End: 2021-04-13 | Stop reason: HOSPADM

## 2021-04-12 RX ORDER — ACETAMINOPHEN 325 MG/1
650 TABLET ORAL EVERY 4 HOURS PRN
Status: DISCONTINUED | OUTPATIENT
Start: 2021-04-12 | End: 2021-04-13 | Stop reason: HOSPADM

## 2021-04-12 RX ORDER — METRONIDAZOLE 500 MG/1
500 TABLET ORAL 3 TIMES DAILY
Status: DISCONTINUED | OUTPATIENT
Start: 2021-04-12 | End: 2021-04-13 | Stop reason: HOSPADM

## 2021-04-12 RX ORDER — SODIUM CHLORIDE 0.9 % (FLUSH) 0.9 %
10 SYRINGE (ML) INJECTION EVERY 12 HOURS SCHEDULED
Status: DISCONTINUED | OUTPATIENT
Start: 2021-04-12 | End: 2021-04-13 | Stop reason: HOSPADM

## 2021-04-12 RX ORDER — KETOROLAC TROMETHAMINE 30 MG/ML
30 INJECTION, SOLUTION INTRAMUSCULAR; INTRAVENOUS EVERY 6 HOURS PRN
Status: DISCONTINUED | OUTPATIENT
Start: 2021-04-12 | End: 2021-04-12

## 2021-04-12 RX ADMIN — HYDROCODONE BITARTRATE AND ACETAMINOPHEN 1 TABLET: 5; 325 TABLET ORAL at 22:49

## 2021-04-12 RX ADMIN — METRONIDAZOLE 500 MG: 500 TABLET, FILM COATED ORAL at 20:45

## 2021-04-12 RX ADMIN — SODIUM CHLORIDE 100 ML/HR: 9 INJECTION, SOLUTION INTRAVENOUS at 18:44

## 2021-04-12 RX ADMIN — FENTANYL CITRATE 50 MCG: 50 INJECTION INTRAMUSCULAR; INTRAVENOUS at 16:33

## 2021-04-12 RX ADMIN — POLYETHYLENE GLYCOL 3350 17 G: 17 POWDER, FOR SOLUTION ORAL at 18:44

## 2021-04-12 RX ADMIN — LEVOFLOXACIN 750 MG: 750 TABLET, FILM COATED ORAL at 18:46

## 2021-04-12 RX ADMIN — ONDANSETRON 4 MG: 2 INJECTION INTRAMUSCULAR; INTRAVENOUS at 15:54

## 2021-04-12 RX ADMIN — TOPIRAMATE 100 MG: 25 TABLET, FILM COATED ORAL at 20:45

## 2021-04-12 RX ADMIN — METOCLOPRAMIDE 5 MG: 5 INJECTION, SOLUTION INTRAMUSCULAR; INTRAVENOUS at 18:44

## 2021-04-12 RX ADMIN — KETOROLAC TROMETHAMINE 15 MG: 30 INJECTION, SOLUTION INTRAMUSCULAR; INTRAVENOUS at 19:01

## 2021-04-12 RX ADMIN — FENTANYL CITRATE 50 MCG: 50 INJECTION INTRAMUSCULAR; INTRAVENOUS at 15:54

## 2021-04-12 RX ADMIN — SODIUM CHLORIDE, PRESERVATIVE FREE 10 ML: 5 INJECTION INTRAVENOUS at 20:46

## 2021-04-12 NOTE — NURSING NOTE
Vss. A&O x4. Clear diet. Iv fluids in place. Medicated 1x with toradol for pain in LLQ. Relief noted with toradol. Pt resting in room.

## 2021-04-12 NOTE — TELEPHONE ENCOUNTER
Pt called and reports she went to ER over the weekend and was dx'd with diverticulitis.  ER started 2 antibiotics.  Pt states she was told to call this office. Pt reports no improvement with pain and she has been up all night.   Dr. Kay informed and orders the followin) Low residue diet - pt will   2) drink more fluids  3) contact [facility] since they are managing gastoparesis    Pt informed and agreeable.  States she will come to office and PU diet.

## 2021-04-12 NOTE — H&P
[facility] HOSPITALIST     Victorino Wolfe DO    CHIEF COMPLAINT:     Abdominal pain    HISTORY OF PRESENT ILLNESS:    45-year-old female past medical history of gastroparesis IBS gets IV Ig from the [facility] who presented to the ER for abdominal pain.  She was diagnosed with diverticulitis on 4/9/2021 this emergency room.  She been taking her antibiotics as prescribed however the pain was no better.  Has been quite quite severe over the weekend.  She did not want to take narcotics as she has problems with constipation with IBS however the pain was too unrelenting and so she came to the ER for reevaluation.  In the ER pain had to be controlled with several doses of IV medication and then admission was requested for this abdominal pain.  In the ER, they tried to talk with patient's gastroenterologist from [facility], however there were no beds available at the facilities and the patient was amenable to being admitted here.      Past Medical History:   Diagnosis Date   • Anemia    • Diverticulitis    • GERD (gastroesophageal reflux disease)    • Hiatal hernia    • Menstrual abnormality     SCHEDULED FOR SX   • Migraine    • Ulcer of abdomen wall (CMS/HCC)    • UTI (urinary tract infection)      Past Surgical History:   Procedure Laterality Date   • CHOLECYSTECTOMY N/A 9/23/2016    Procedure: CHOLECYSTECTOMY LAPAROSCOPIC;  Surgeon: Cordell Dejesus MD;  Location: [facility] OR;  Service:    • COLONOSCOPY N/A 11/14/2017    Procedure: COLONOSCOPY,   polypectomy ;  Surgeon: Anca Kay DO;  Location: [facility] OR;  Service:    • COLONOSCOPY N/A 6/23/2020    Procedure: Colonoscopy with possible biopsy or polypectomy;  Surgeon: Anca Kay DO;  Location: [facility] OR;  Service: Gastroenterology;  Laterality: N/A;  SIGMOID POLYP  HEPATIC FLEXURE POLYP  DIVERTICULOSIS     • D & C HYSTEROSCOPY MYOSURE N/A 8/10/2016    Procedure: DILATATION AND CURETTAGE HYSTEROSCOPY ;   Surgeon: Frank Alvarado MD;  Location: [facility] OR;  Service:    • DIAGNOSTIC LAPAROSCOPY     • ECTOPIC PREGNANCY SURGERY     • ENDOSCOPY N/A 11/14/2017    Procedure: ESOPHAGOGASTRODUODENOSCOPY  with gastric biopsy for  SHERI TEST, POLYPECTOMY ;  Surgeon: Anca Kay DO;  Location: [facility] OR;  Service:    • ENDOSCOPY N/A 6/23/2020    Procedure: Esophagogastroduodenoscopy with possible biopsy, polypectomy, dilation;  Surgeon: Anca Kay DO;  Location: [facility] OR;  Service: Gastroenterology;  Laterality: N/A;  SHERI TEST  HIATAL HERNIA  GE JUNCTION BIOPSY   • FOOT SURGERY Right     x3   • HYSTERECTOMY     • TX LAP, RADICAL HYST W/ TUBE&OV, NODE BX N/A 11/28/2016    Procedure: TOTAL LAPAROSCOPIC HYSTERECTOMY, bilateral salpingectomy, ablation of peritoneal endometriosis;  Surgeon: Frank Alvarado MD;  Location: [facility];  Service: Obstetrics/Gynecology   • TUBAL ABDOMINAL LIGATION       Family History   Problem Relation Age of Onset   • Hypertension Mother    • Cancer Father    • Hypertension Father    • Breast cancer Cousin      Social History     Tobacco Use   • Smoking status: Former Smoker     Packs/day: 1.50   • Smokeless tobacco: Never Used   Vaping Use   • Vaping Use: Every day   • Substances: Nicotine, Flavoring   • Devices: Refillable tank   • Passive vaping exposure Yes   Substance Use Topics   • Alcohol use: Yes     Comment: rarely   • Drug use: No     (Not in a hospital admission)    Allergies:  Patient has no known allergies.    Immunization History   Administered Date(s) Administered   • Flu Vaccine Quad PF >36MO 11/28/2016           REVIEW OF SYSTEMS:  Please see the above history of present illness for pertinent positives and negatives.  The remainder of the patient's systems have been reviewed and are negative.     Objective     Vital Signs  Temp:  [97 °F (36.1 °C)] 97 °F (36.1 °C)  Heart Rate:  [102-110] 102  Resp:  [16-18] 18  BP: (100-122)/(70-83) 122/83    Flowsheet  "Rows      First Filed Value   Admission Height  168.9 cm (66.5\") Documented at 04/12/2021 1135   Admission Weight  61.2 kg (135 lb) Documented at 04/12/2021 1135           Physical Exam:  Physical Exam    Gen: NAD  HEENT: EOMI, no icterus, PERRL  Neck: No JVD  Heart: RRR, no murmur  Lung: CTA b/l, adequate air movement  ABD: Tender to palpate mainly in the lower quadrant  MSK: moves ext spontaneously  Neuro: AO x 3, CN II-XII intact  Psych: no anxiety, no depression  Skin: warm, dry, intact  Extremities:  No edema  Results Review:    I reviewed the patient's new clinical results.  Lab Results (most recent)     Procedure Component Value Units Date/Time    COVID PRE-OP / PRE-PROCEDURE SCREENING ORDER (NO ISOLATION) - Swab, Nasal Cavity [[id number]] Collected: 04/12/21 1711    Specimen: Swab from Nasal Cavity Updated: 04/12/21 1713    Narrative:      The following orders were created for panel order COVID PRE-OP / PRE-PROCEDURE SCREENING ORDER (NO ISOLATION) - Swab, Nasal Cavity.  Procedure                               Abnormality         Status                     ---------                               -----------         ------                     COVID-19,[facility] IN-DEB[facility]...[[id number]]                      In process                   Please view results for these tests on the individual orders.    COVID-19,[facility] IN-HOUSE,Nasal Swab No Transport Media 3-4 HR TAT - Swab, Nasal Cavity [[id number]] Collected: 04/12/21 1711    Specimen: Swab from Nasal Cavity Updated: 04/12/21 1713    [place] Draw [[id number]] Collected: 04/12/21 1223    Specimen: Blood Updated: 04/12/21 1331    Narrative:      The following orders were created for panel order [place] Draw.  Procedure                               Abnormality         Status                     ---------                               -----------         ------                     Light Blue Top[[id number]]                                   Final result             "   Green Top (Gel)[[id number]]                                  Final result               Lavender Top[[id number]]                                     Final result               Gold Top - SST[[id number]]                                   Final result                 Please view results for these tests on the individual orders.    Light Blue Top [[id number]] Collected: 04/12/21 1223    Specimen: Blood from Arm, Left Updated: 04/12/21 1331     Extra Tube hold for add-on     Comment: Auto resulted       Green Top (Gel) [[id number]] Collected: 04/12/21 1223    Specimen: Blood Updated: 04/12/21 1331     Extra Tube Hold for add-ons.     Comment: Auto resulted.       Lavender Top [[id number]] Collected: 04/12/21 1223    Specimen: Blood Updated: 04/12/21 1331     Extra Tube hold for add-on     Comment: Auto resulted       Gold Top - SST [[id number]] Collected: 04/12/21 1223    Specimen: Blood from Arm, Left Updated: 04/12/21 1331     Extra Tube Hold for add-ons.     Comment: Auto resulted.       Comprehensive Metabolic Panel [[id number]]  (Abnormal) Collected: 04/12/21 1223    Specimen: Blood Updated: 04/12/21 1318     Glucose 117 mg/dL      BUN 7 mg/dL      Creatinine 0.90 mg/dL      Sodium 135 mmol/L      Potassium 3.7 mmol/L      Chloride 102 mmol/L      CO2 22.3 mmol/L      Calcium 9.5 mg/dL      Total Protein 8.3 g/dL      Albumin 3.80 g/dL      ALT (SGPT) 14 U/L      AST (SGOT) 20 U/L      Alkaline Phosphatase 70 U/L      Total Bilirubin 0.7 mg/dL      eGFR Non African Amer 67 mL/min/1.73      Globulin 4.5 gm/dL      A/G Ratio 0.8 g/dL      BUN/Creatinine Ratio 7.8     Anion Gap 10.7 mmol/L     Narrative:      GFR Normal >60  Chronic Kidney Disease <60  Kidney Failure <15      Lipase [[id number]]  (Normal) Collected: 04/12/21 1223    Specimen: Blood Updated: 04/12/21 1318     Lipase 17 U/L     Urinalysis, Microscopic Only - Urine, Clean Catch [[id number]]  (Abnormal) Collected: 04/12/21 1223    Specimen: Urine, Clean Catch  Updated: 04/12/21 1316     RBC, UA None Seen /HPF      WBC, UA 0-2 /HPF      Bacteria, UA None Seen /HPF      Squamous Epithelial Cells, UA 0-2 /HPF      Hyaline Casts, UA None Seen /LPF      Calcium Oxalate Crystals, UA Small/1+ /HPF      Methodology Manual Light Microscopy    Urinalysis With Microscopic If Indicated (No Culture) - Urine, Clean Catch [[id number]]  (Abnormal) Collected: 04/12/21 1223    Specimen: Urine, Clean Catch Updated: 04/12/21 1254     Color, UA Yellow     Appearance, UA Clear     pH, UA 5.5     Specific Gravity, UA 1.020     Comment: Result obtained by Refractometer        Glucose, UA Negative     Ketones, UA Negative     Bilirubin, UA Negative     Blood, UA Negative     Protein, UA Negative     Leuk Esterase, UA Trace     Nitrite, UA Negative     Urobilinogen, UA 0.2 E.U./dL    CBC & Differential [[id number]]  (Abnormal) Collected: 04/12/21 1223    Specimen: Blood Updated: 04/12/21 1246    Narrative:      The following orders were created for panel order CBC & Differential.  Procedure                               Abnormality         Status                     ---------                               -----------         ------                     CBC Auto Differential[[id number]]        Abnormal            Final result                 Please view results for these tests on the individual orders.    CBC Auto Differential [[id number]]  (Abnormal) Collected: 04/12/21 1223    Specimen: Blood Updated: 04/12/21 1246     WBC 7.49 10*3/mm3      RBC 4.61 10*6/mm3      Hemoglobin 14.2 g/dL      Hematocrit 42.8 %      MCV 92.8 fL      MCH 30.8 pg      MCHC 33.2 g/dL      RDW 11.8 %      RDW-SD 39.6 fl      MPV 9.0 fL      Platelets 277 10*3/mm3      Neutrophil % 79.8 %      Lymphocyte % 10.8 %      Monocyte % 7.7 %      Eosinophil % 0.9 %      Basophil % 0.5 %      Immature Grans % 0.3 %      Neutrophils, Absolute 5.97 10*3/mm3      Lymphocytes, Absolute 0.81 10*3/mm3      Monocytes, Absolute 0.58 10*3/mm3       Eosinophils, Absolute 0.07 10*3/mm3      Basophils, Absolute 0.04 10*3/mm3      Immature Grans, Absolute 0.02 10*3/mm3      nRBC 0.0 /100 WBC           Imaging Results (Most Recent)     None            ECG/EMG Results (most recent)     None              Assessment/Plan     Active Hospital Problems:  Active Hospital Problems    Diagnosis  POA   • Lower abdominal pain [R10.30]  Yes      Resolved Hospital Problems   No resolved problems to display.       Acute abdominal pain  -Likely due to diverticulitis possible IBS flare  -Pain control with Toradol  -Clear liquid diet for now  -Check KUB and if okay will intensify bowel regimen    Sigmoid diverticulitis  -Continue Levaquin and Flagyl    IBS gastroparesis  -Followed by [facility]  -Add IV Reglan    Migraines  -If verified we will continue Topamax    DVT ppx-SCD    This patient has been examined wearing appropriate Personal Protective Equipment. 04/12/21        Electronically signed by Kavon Singh DO, 04/12/21, 17:14 EDT.

## 2021-04-12 NOTE — ED PROVIDER NOTES
Subjective   History of Present Illness  History of Present Illness    Chief complaint: Abdominal pain    Location: Left lower quadrant, radiating to the back    Quality/Severity: 10/10 at its worst, 9/10 current    Timing/Onset/Duration: Vaginal onset since approximately April 6.    Modifying Factors: Nothing seems to make it better    Associated Symptoms: No headache.  Patient has had chills, no fever.  No sore throat earache or nasal congestion.  Patient had nausea but no vomiting.  No diarrhea.  She reports constipation.  No burning on urination.  The patient does have IBS with constipation.  No black or bloody stools.    Narrative: This 45-year-old white female with a history of diverticulosis, was diagnosed with diverticulitis on April 9, she was seen here in the emergency department and put on Cipro and Flagyl the patient states that her pain is getting worse she called Dr. Cancino's office who put her on a low residue diet and advised her to contact her GI physician, Dr. Marks, at [facility].  Dr. Marks manages her IBS with constipation.  Patient is on Linzess.  The patient did not want pain medications at the time of her visit here in the emergency department on April 9 because she has a history of constipation and wanted to avoid constipating pain medication.    Surgeon: Julita    PCP:Victorino Wolfe, DO                 Review of Systems     Medication List      ASK your doctor about these medications    ciprofloxacin 500 MG tablet  Commonly known as: CIPRO  Take 1 tablet by mouth 2 (Two) Times a Day for 10 days.     LINZESS PO     metroNIDAZOLE 500 MG tablet  Commonly known as: FLAGYL  Take 1 tablet by mouth 3 (Three) Times a Day for 10 days.     omeprazole 20 MG capsule  Commonly known as: priLOSEC     PANZYGA IV     SUMAtriptan 100 MG tablet  Commonly known as: IMITREX     topiramate 100 MG tablet  Commonly known as: TOPAMAX            Past Medical History:   Diagnosis Date   • Anemia    •  Diverticulitis    • GERD (gastroesophageal reflux disease)    • Hiatal hernia    • Menstrual abnormality     SCHEDULED FOR SX   • Migraine    • Ulcer of abdomen wall (CMS/HCC)    • UTI (urinary tract infection)        No Known Allergies    Past Surgical History:   Procedure Laterality Date   • CHOLECYSTECTOMY N/A 9/23/2016    Procedure: CHOLECYSTECTOMY LAPAROSCOPIC;  Surgeon: Cordell Dejesus MD;  Location: [facility] OR;  Service:    • COLONOSCOPY N/A 11/14/2017    Procedure: COLONOSCOPY,   polypectomy ;  Surgeon: Anca Kay DO;  Location: [facility] OR;  Service:    • COLONOSCOPY N/A 6/23/2020    Procedure: Colonoscopy with possible biopsy or polypectomy;  Surgeon: Anca Kay DO;  Location: [facility] OR;  Service: Gastroenterology;  Laterality: N/A;  SIGMOID POLYP  HEPATIC FLEXURE POLYP  DIVERTICULOSIS     • D & C HYSTEROSCOPY MYOSURE N/A 8/10/2016    Procedure: DILATATION AND CURETTAGE HYSTEROSCOPY ;  Surgeon: Frank Alvarado MD;  Location: [facility] OR;  Service:    • DIAGNOSTIC LAPAROSCOPY     • ECTOPIC PREGNANCY SURGERY     • ENDOSCOPY N/A 11/14/2017    Procedure: ESOPHAGOGASTRODUODENOSCOPY  with gastric biopsy for  SHERI TEST, POLYPECTOMY ;  Surgeon: Anca Kay DO;  Location: [facility] OR;  Service:    • ENDOSCOPY N/A 6/23/2020    Procedure: Esophagogastroduodenoscopy with possible biopsy, polypectomy, dilation;  Surgeon: Anca Kay DO;  Location: [facility] OR;  Service: Gastroenterology;  Laterality: N/A;  SHERI TEST  HIATAL HERNIA  GE JUNCTION BIOPSY   • FOOT SURGERY Right     x3   • HYSTERECTOMY     • ND LAP, RADICAL HYST W/ TUBE&OV, NODE BX N/A 11/28/2016    Procedure: TOTAL LAPAROSCOPIC HYSTERECTOMY, bilateral salpingectomy, ablation of peritoneal endometriosis;  Surgeon: Frank Alvarado MD;  Location: [facility] OR;  Service: Obstetrics/Gynecology   • TUBAL ABDOMINAL LIGATION         Family History   Problem Relation Age of Onset   • Hypertension Mother    • Cancer Father     • Hypertension Father    • Breast cancer Cousin        Social History     Socioeconomic History   • Marital status: Single     Spouse name: Not on file   • Number of children: Not on file   • Years of education: Not on file   • Highest education level: Not on file   Tobacco Use   • Smoking status: Former Smoker     Packs/day: 1.50   • Smokeless tobacco: Never Used   Vaping Use   • Vaping Use: Every day   • Substances: Nicotine, Flavoring   • Devices: Refillable tank   • Passive vaping exposure Yes   Substance and Sexual Activity   • Alcohol use: Yes     Comment: rarely   • Drug use: No   • Sexual activity: Defer           Objective   Physical Exam    Procedures           ED Course  ED Course as of Apr 12 1630   Mon Apr 12, 2021   1508 The serum glucose is 117.  The sodium is 135.  Urinalysis shows 0-2 white blood cells and trace leukocytes.  The laboratory values are otherwise unremarkable.    [RC]   1625 Specific Gravity, UA: 1.020 [RC]      ED Course User Index  [RC] Artemio Wade MD       16:23 EDT, 04/12/21:  The patient was reassessed.  She rates her pain a 7/10.  She would like something additional for the pain abdominal exam: Soft, moderate left sided tenderness, no rebound, no guarding, no masses, positive bowel sounds.    16:30 EDT, 04/12/21:  I spoke with Dr. Cancino, on-call for general surgery, and she wants to refer this case to [facility] GI service due to the fact that she gets IVIG for her IBS with constipation.    16:50 EDT, 04/12/21: There are no beds available at [facility].  The hospitalist on-call for [facility] has been paged out.    16:52 EDT, 04/12/21:  I spoke with Dr. Singh, on-call for the hospitalist, he will place the patient in observation    16:52 EDT, 04/12/21:  The patient's diagnosis of diverticulitis with abdominal pain was discussed with her.  She will be brought in for observation and pain control, continued antibiotics and treatment.  All of her questions were  answered she will be brought in for observation in stable condition                                  MDM    Final diagnoses:   Lower abdominal pain   Diverticulitis       ED Disposition  ED Disposition     None          No follow-up provider specified.       Medication List      No changes were made to your prescriptions during this visit.          Artemio Wade MD  04/12/21 [id number]

## 2021-04-13 VITALS
RESPIRATION RATE: 16 BRPM | HEART RATE: 85 BPM | DIASTOLIC BLOOD PRESSURE: 63 MMHG | BODY MASS INDEX: 21.94 KG/M2 | HEIGHT: 66 IN | OXYGEN SATURATION: 100 % | TEMPERATURE: 98.7 F | SYSTOLIC BLOOD PRESSURE: 96 MMHG | WEIGHT: 136.5 LBS

## 2021-04-13 PROBLEM — R10.30 LOWER ABDOMINAL PAIN: Status: RESOLVED | Noted: 2021-04-12 | Resolved: 2021-04-13

## 2021-04-13 LAB
ALBUMIN SERPL-MCNC: 3.3 G/DL (ref 3.5–5.2)
ALBUMIN/GLOB SERPL: 0.9 G/DL
ALP SERPL-CCNC: 59 U/L (ref 39–117)
ALT SERPL W P-5'-P-CCNC: 10 U/L (ref 1–33)
ANION GAP SERPL CALCULATED.3IONS-SCNC: 10.6 MMOL/L (ref 5–15)
AST SERPL-CCNC: 13 U/L (ref 1–32)
BILIRUB SERPL-MCNC: 0.9 MG/DL (ref 0–1.2)
BUN SERPL-MCNC: 7 MG/DL (ref 6–20)
BUN/CREAT SERPL: 9 (ref 7–25)
CALCIUM SPEC-SCNC: 8.7 MG/DL (ref 8.6–10.5)
CHLORIDE SERPL-SCNC: 105 MMOL/L (ref 98–107)
CO2 SERPL-SCNC: 21.4 MMOL/L (ref 22–29)
CREAT SERPL-MCNC: 0.78 MG/DL (ref 0.57–1)
DEPRECATED RDW RBC AUTO: 39.3 FL (ref 37–54)
ERYTHROCYTE [DISTWIDTH] IN BLOOD BY AUTOMATED COUNT: 11.6 % (ref 12.3–15.4)
GFR SERPL CREATININE-BSD FRML MDRD: 79 ML/MIN/1.73
GLOBULIN UR ELPH-MCNC: 3.7 GM/DL
GLUCOSE SERPL-MCNC: 100 MG/DL (ref 65–99)
HCT VFR BLD AUTO: 36.7 % (ref 34–46.6)
HGB BLD-MCNC: 12.2 G/DL (ref 12–15.9)
MCH RBC QN AUTO: 30.7 PG (ref 26.6–33)
MCHC RBC AUTO-ENTMCNC: 33.2 G/DL (ref 31.5–35.7)
MCV RBC AUTO: 92.4 FL (ref 79–97)
PLATELET # BLD AUTO: 210 10*3/MM3 (ref 140–450)
PMV BLD AUTO: 9.3 FL (ref 6–12)
POTASSIUM SERPL-SCNC: 3.4 MMOL/L (ref 3.5–5.2)
PROT SERPL-MCNC: 7 G/DL (ref 6–8.5)
RBC # BLD AUTO: 3.97 10*6/MM3 (ref 3.77–5.28)
SODIUM SERPL-SCNC: 137 MMOL/L (ref 136–145)
WBC # BLD AUTO: 4.68 10*3/MM3 (ref 3.4–10.8)

## 2021-04-13 PROCEDURE — 85027 COMPLETE CBC AUTOMATED: CPT | Performed by: INTERNAL MEDICINE

## 2021-04-13 PROCEDURE — 25010000002 METOCLOPRAMIDE PER 10 MG: Performed by: INTERNAL MEDICINE

## 2021-04-13 PROCEDURE — 99217 PR OBSERVATION CARE DISCHARGE MANAGEMENT: CPT | Performed by: INTERNAL MEDICINE

## 2021-04-13 PROCEDURE — 80053 COMPREHEN METABOLIC PANEL: CPT | Performed by: INTERNAL MEDICINE

## 2021-04-13 PROCEDURE — 96361 HYDRATE IV INFUSION ADD-ON: CPT

## 2021-04-13 PROCEDURE — G0378 HOSPITAL OBSERVATION PER HR: HCPCS

## 2021-04-13 PROCEDURE — 25010000002 KETOROLAC TROMETHAMINE PER 15 MG: Performed by: INTERNAL MEDICINE

## 2021-04-13 PROCEDURE — 96376 TX/PRO/DX INJ SAME DRUG ADON: CPT

## 2021-04-13 RX ORDER — POLYETHYLENE GLYCOL 3350 17 G/17G
17 POWDER, FOR SOLUTION ORAL DAILY
Qty: 14 EACH | Refills: 0 | Status: SHIPPED | OUTPATIENT
Start: 2021-04-13

## 2021-04-13 RX ORDER — HYDROCODONE BITARTRATE AND ACETAMINOPHEN 5; 325 MG/1; MG/1
1 TABLET ORAL EVERY 8 HOURS PRN
Qty: 10 TABLET | Refills: 0 | Status: SHIPPED | OUTPATIENT
Start: 2021-04-13 | End: 2022-11-30

## 2021-04-13 RX ADMIN — ACETAMINOPHEN 650 MG: 325 TABLET, FILM COATED ORAL at 06:44

## 2021-04-13 RX ADMIN — METOCLOPRAMIDE 5 MG: 5 INJECTION, SOLUTION INTRAMUSCULAR; INTRAVENOUS at 02:15

## 2021-04-13 RX ADMIN — LINACLOTIDE 145 MCG: 145 CAPSULE, GELATIN COATED ORAL at 08:30

## 2021-04-13 RX ADMIN — POLYETHYLENE GLYCOL 3350 17 G: 17 POWDER, FOR SOLUTION ORAL at 08:29

## 2021-04-13 RX ADMIN — KETOROLAC TROMETHAMINE 15 MG: 30 INJECTION, SOLUTION INTRAMUSCULAR; INTRAVENOUS at 02:15

## 2021-04-13 RX ADMIN — LEVOFLOXACIN 750 MG: 750 TABLET, FILM COATED ORAL at 08:29

## 2021-04-13 RX ADMIN — TOPIRAMATE 100 MG: 25 TABLET, FILM COATED ORAL at 08:29

## 2021-04-13 RX ADMIN — SODIUM CHLORIDE 100 ML/HR: 9 INJECTION, SOLUTION INTRAVENOUS at 03:53

## 2021-04-13 RX ADMIN — PANTOPRAZOLE SODIUM 40 MG: 40 TABLET, DELAYED RELEASE ORAL at 06:15

## 2021-04-13 RX ADMIN — SODIUM CHLORIDE, PRESERVATIVE FREE 10 ML: 5 INJECTION INTRAVENOUS at 08:33

## 2021-04-13 RX ADMIN — METRONIDAZOLE 500 MG: 500 TABLET, FILM COATED ORAL at 08:29

## 2021-04-13 NOTE — NURSING NOTE
Case Management Discharge Note      Final Note: D/C home         Selected Continued Care - Discharged on 4/13/2021 Admission date: 4/12/2021 - Discharge disposition: Home or Self Care    Destination    No services have been selected for the patient.              Durable Medical Equipment    No services have been selected for the patient.              Dialysis/Infusion    No services have been selected for the patient.              Home Medical Care    No services have been selected for the patient.              Therapy    No services have been selected for the patient.              Community Resources    No services have been selected for the patient.                       Final Discharge Disposition Code: 01 - home or self-care

## 2021-04-13 NOTE — DISCHARGE INSTR - APPOINTMENTS
Patient needs to call and make a Hospital follow up with Victorino Wolfe within 1-2 weeks of discharge [phone]  Patient has an appointment with Dr. Marks  On April 22  @ 1pm.

## 2021-04-13 NOTE — DISCHARGE SUMMARY
Date of Admission: 4/12/2021    Date of Discharge:  4/13/2021    Length of stay:  LOS: 0 days     Presenting Problem:   Diverticulitis [K57.92]  Lower abdominal pain [R10.30]      Principal and Active Diagnosis During Hospital Stay:     Active Hospital Problems   No active problems to display.      Resolved Hospital Problems    Diagnosis Date Resolved POA   • Lower abdominal pain [R10.30] 04/13/2021 Yes     Acute abdominal pain  -Likely due to diverticulitis possible IBS flare  -Pain control   -Added MiraLAX to bowel regimen  -KUB was nonobstructive and no other acute process identified  -Improved     Sigmoid diverticulitis  -WBC remains normal  -Continue Cipro and Flagyl at home  -Clear liquids for now and advance in next couple days     IBS gastroparesis  -Followed by [facility]  -Can follow-up for outpatient IVIG and follow-up with gastroenterology as an outpatient     Migraines  -If verified we will continue Topamax      Hospital Course  Patient is a 45 y.o. female presented with nominal pain was recently diagnosed with acute diverticulitis.  She has a history of significant IBS gets IVIG as an outpatient.  She was admitted had pain control and actually after increasing her bowel regimen she actually had a bowel movement with improvement in her abdominal pain.  It was at this point where she was felt stable to discharge home she had an outpatient IVIG infusion coming up for her IBS and it was felt prudent to allow her to leave as she was stable so she could receive this infusion.  The patient was in agreement with this and can follow-up with her gastroenterologist on an outpatient basis.  She was stable for discharge.        Procedures Performed:as noted above         Consults:   Consults     No orders found for last 30 day(s).          Pertinent Test Results:     Results from last 7 days   Lab Units 04/13/21  0434 04/12/21  1223 04/09/21  1455   WBC 10*3/mm3 4.68 7.49 8.13   HEMOGLOBIN g/dL  12.2 14.2 14.5   HEMATOCRIT % 36.7 42.8 45.0   PLATELETS 10*3/mm3 210 277 256     Results from last 7 days   Lab Units 04/13/21  0434 04/12/21  1223 04/09/21  1455   SODIUM mmol/L 137 135* 136   POTASSIUM mmol/L 3.4* 3.7 3.9   CHLORIDE mmol/L 105 102 100   CO2 mmol/L 21.4* 22.3 24.4   BUN mg/dL 7 7 5*   CREATININE mg/dL 0.78 0.90 0.87   CALCIUM mg/dL 8.7 9.5 9.7   BILIRUBIN mg/dL 0.9 0.7 0.8   ALK PHOS U/L 59 70 73   ALT (SGPT) U/L 10 14 18   AST (SGOT) U/L 13 20 21   GLUCOSE mg/dL 100* 117* 148*       Microbiology Results (last 10 days)     Procedure Component Value - Date/Time    COVID PRE-OP / PRE-PROCEDURE SCREENING ORDER (NO ISOLATION) - Swab, Nasal Cavity [[id number]]  (Normal) Collected: 04/12/21 1711    Lab Status: Final result Specimen: Swab from Nasal Cavity Updated: 04/12/21 1745    Narrative:      The following orders were created for panel order COVID PRE-OP / PRE-PROCEDURE SCREENING ORDER (NO ISOLATION) - Swab, Nasal Cavity.  Procedure                               Abnormality         Status                     ---------                               -----------         ------                     COVID-19,[facility]...[[id number]]  Normal              Final result                 Please view results for these tests on the individual orders.    COVID-19,[facility] [employer] IN-HOUSE,Nasal Swab No Transport Media 3-4 HR TAT - Swab, Nasal Cavity [[id number]]  (Normal) Collected: 04/12/21 1711    Lab Status: Final result Specimen: Swab from Nasal Cavity Updated: 04/12/21 1745     COVID19 Not Detected    Narrative:      Fact sheet for providers: https://www.fda.gov/media/918296/download     Fact sheet for patients: https://www.fda.gov/media/837534/download    Test performed by PCR.    Consider negative results in combination with clinical observations, patient history, and epidemiological information.              Imaging Results (All)     Procedure Component Value Units Date/Time    XR Abdomen KUB [[id number]]  Collected: 04/12/21 2024     Updated: 04/12/21 2027    Narrative:      CR Abdomen 1 Vw    INDICATION:   Abdominal pain in the low abdomen beginning prior to arrival    COMPARISON:   None available    FINDINGS:  AP radiographs of the abdomen. The renal shadows are symmetric. No renal calculi. No bladder calculi.    The bowel gas pattern is nonobstructive. No acute osseous abnormalities.      Impression:      Negative KUB.    Signer Name: Bay Dela Cruz MD   Signed: 4/12/2021 8:24 PM   Workstation Name: [id number]    [facility]            Condition on Discharge:  Stable     Vital Signs  Temp:  [97 °F (36.1 °C)-98.9 °F (37.2 °C)] 98.7 °F (37.1 °C)  Heart Rate:  [] 85  Resp:  [16-18] 16  BP: ()/(61-83) 96/63    Physical Exam:  Physical Exam  Cardiovascular:      Rate and Rhythm: Normal rate.   Pulmonary:      Effort: Pulmonary effort is normal.   Abdominal:      General: There is no distension.      Tenderness: There is no guarding.      Comments: Minimal tenderness left lower quadrant   Neurological:      Mental Status: She is alert and oriented to person, place, and time.   Psychiatric:         Mood and Affect: Mood normal.         Discharge Disposition  Home or Self Care    Discharge Medications     Discharge Medications      New Medications      Instructions Start Date   HYDROcodone-acetaminophen 5-325 MG per tablet  Commonly known as: NORCO   1 tablet, Oral, Every 8 Hours PRN      polyethylene glycol 17 g packet  Commonly known as: MIRALAX   17 g, Oral, Daily         Continue These Medications      Instructions Start Date   ciprofloxacin 500 MG tablet  Commonly known as: CIPRO   500 mg, Oral, 2 Times Daily      LINZESS PO   145 mcg, Oral, Daily      metroNIDAZOLE 500 MG tablet  Commonly known as: FLAGYL   500 mg, Oral, 3 Times Daily      omeprazole 20 MG capsule  Commonly known as: priLOSEC   40 mg, Oral, Daily      PANZYGA IV   25 mg, Intravenous      SUMAtriptan 100 MG  tablet  Commonly known as: IMITREX   100 mg.      topiramate 100 MG tablet  Commonly known as: TOPAMAX   100 mg, Oral, 2 Times Daily             Discharge Diet:   Diet Instructions     Advance Diet As Tolerated      Diet: Clear Liquid; Thin Liquids, No Restrictions      Discharge Diet: Clear Liquid    Fluid Consistency: Thin Liquids, No Restrictions          Activity at Discharge:     Follow-up Appointments  No future appointments.  Additional Instructions for the Follow-ups that You Need to Schedule     Discharge Follow-up with Specified Provider: gastroenterologist; 2 Weeks   As directed      To: gastroenterologist    Follow Up: 2 Weeks               Test Results Pending at Discharge       Risk for Readmission (LACE) Score: 2 (4/13/2021  6:01 AM)      This patient has been examined wearing appropriate Personal Protective Equipment . 04/13/21          Electronically signed by Kavon Singh DO, 04/13/21, 08:32 EDT.

## 2021-04-13 NOTE — PLAN OF CARE
Problem: Adult Inpatient Plan of Care  Goal: Plan of Care Review  Outcome: Ongoing, Progressing  Flowsheets (Taken 4/13/2021 0543)  Progress: no change  Plan of Care Reviewed With: patient  Outcome Summary: pt arrived to unit with LLQ abd pain - toradol and norco given with some relief - gastroparesis - VSS - rested through the night   Goal Outcome Evaluation:  Plan of Care Reviewed With: patient  Progress: no change  Outcome Summary: pt arrived to unit with LLQ abd pain - toradol and norco given with some relief - gastroparesis - VSS - rested through the night

## 2021-04-14 ENCOUNTER — READMISSION MANAGEMENT (OUTPATIENT)
Dept: CALL CENTER | Facility: HOSPITAL | Age: 46
End: 2021-04-14

## 2021-04-14 NOTE — OUTREACH NOTE
Prep Survey      Responses   [facility] [employer] facility patient discharged from?  [place]   Is LACE score < 7 ?  Yes   Emergency Room discharge w/ pulse ox?  No   Eligibility  Readm Mgmt   Discharge diagnosis  Acute abdominal painIBS gastroparesis   Does the patient have one of the following disease processes/diagnoses(primary or secondary)?  Other   Does the patient have Home health ordered?  No   Is there a DME ordered?  No   Prep survey completed?  Yes          Tess Baker RN

## 2021-04-15 ENCOUNTER — READMISSION MANAGEMENT (OUTPATIENT)
Dept: CALL CENTER | Facility: HOSPITAL | Age: 46
End: 2021-04-15

## 2021-04-15 NOTE — OUTREACH NOTE
LAG < 7 Survey      Responses   [employer] facility patient discharged from?  [place]   Does the patient have one of the following disease processes/diagnoses(primary or secondary)?  Other   BHLAG <7 Attempt successful?  No          Tawana Streeter RN

## 2021-04-19 ENCOUNTER — READMISSION MANAGEMENT (OUTPATIENT)
Dept: CALL CENTER | Facility: HOSPITAL | Age: 46
End: 2021-04-19

## 2021-04-19 NOTE — OUTREACH NOTE
LAG < 7 Survey      Responses   [facility] facility patient discharged from?  [place]   Does the patient have one of the following disease processes/diagnoses(primary or secondary)?  Other   BHLAG <7 Attempt successful?  No   Unsuccessful attempts  Attempt 1          Pamela Guerrero RN

## 2021-04-20 ENCOUNTER — READMISSION MANAGEMENT (OUTPATIENT)
Dept: CALL CENTER | Facility: HOSPITAL | Age: 46
End: 2021-04-20

## 2021-04-20 NOTE — OUTREACH NOTE
LAG < 7 Survey      Responses   [facility] facility patient discharged from?  [place]   Does the patient have one of the following disease processes/diagnoses(primary or secondary)?  Other   BHLAG <7 Attempt successful?  No   Unsuccessful attempts  Attempt 2          Erika Oleary RN

## 2021-04-21 ENCOUNTER — READMISSION MANAGEMENT (OUTPATIENT)
Dept: CALL CENTER | Facility: HOSPITAL | Age: 46
End: 2021-04-21

## 2021-04-21 NOTE — OUTREACH NOTE
LAG < 7 Survey      Responses   [facility] facility patient discharged from?  [place]   Does the patient have one of the following disease processes/diagnoses(primary or secondary)?  Other   BHLAG <7 Attempt successful?  No   Unsuccessful attempts  Attempt 3          Pamela Guerrero RN

## 2022-01-07 ENCOUNTER — TRANSCRIBE ORDERS (OUTPATIENT)
Dept: ADMINISTRATIVE | Facility: HOSPITAL | Age: 47
End: 2022-01-07

## 2022-01-07 DIAGNOSIS — R10.9 ABDOMINAL PAIN, UNSPECIFIED ABDOMINAL LOCATION: Primary | ICD-10-CM

## 2022-01-17 ENCOUNTER — HOSPITAL ENCOUNTER (OUTPATIENT)
Dept: CT IMAGING | Facility: HOSPITAL | Age: 47
End: 2022-01-17

## 2022-02-01 ENCOUNTER — APPOINTMENT (OUTPATIENT)
Dept: CT IMAGING | Facility: HOSPITAL | Age: 47
End: 2022-02-01

## 2022-02-02 ENCOUNTER — HOSPITAL ENCOUNTER (OUTPATIENT)
Dept: CT IMAGING | Facility: HOSPITAL | Age: 47
Discharge: HOME | End: 2022-02-02
Admitting: PHYSICIAN ASSISTANT

## 2022-02-02 DIAGNOSIS — R10.9 ABDOMINAL PAIN, UNSPECIFIED ABDOMINAL LOCATION: ICD-10-CM

## 2022-02-02 PROCEDURE — 0 IOPAMIDOL PER 1 ML: Performed by: PHYSICIAN ASSISTANT

## 2022-02-02 PROCEDURE — 74175 CTA ABDOMEN W/CONTRAST: CPT

## 2022-02-02 RX ADMIN — IOPAMIDOL 100 ML: 755 INJECTION, SOLUTION INTRAVENOUS at 15:15

## 2022-11-02 ENCOUNTER — OFFICE VISIT (OUTPATIENT)
Dept: ORTHOPEDIC SURGERY | Facility: CLINIC | Age: 47
End: 2022-11-02

## 2022-11-02 VITALS
HEIGHT: 67 IN | DIASTOLIC BLOOD PRESSURE: 78 MMHG | SYSTOLIC BLOOD PRESSURE: 114 MMHG | WEIGHT: 147.6 LBS | HEART RATE: 84 BPM | BODY MASS INDEX: 23.17 KG/M2

## 2022-11-02 DIAGNOSIS — M25.532 LEFT WRIST PAIN: Primary | ICD-10-CM

## 2022-11-02 DIAGNOSIS — M79.645 BILATERAL THUMB PAIN: ICD-10-CM

## 2022-11-02 DIAGNOSIS — M79.644 BILATERAL THUMB PAIN: ICD-10-CM

## 2022-11-02 PROCEDURE — 73110 X-RAY EXAM OF WRIST: CPT | Performed by: NURSE PRACTITIONER

## 2022-11-02 PROCEDURE — 99204 OFFICE O/P NEW MOD 45 MIN: CPT | Performed by: NURSE PRACTITIONER

## 2022-11-02 RX ORDER — MELOXICAM 15 MG/1
15 TABLET ORAL DAILY
Qty: 30 TABLET | Refills: 0 | Status: SHIPPED | OUTPATIENT
Start: 2022-11-02

## 2022-11-02 RX ORDER — DICYCLOMINE HYDROCHLORIDE 10 MG/1
10 CAPSULE ORAL 3 TIMES DAILY
COMMUNITY

## 2022-11-02 RX ORDER — PLECANATIDE 3 MG/1
TABLET ORAL DAILY
COMMUNITY

## 2022-11-02 RX ORDER — PROMETHAZINE HYDROCHLORIDE 25 MG/1
25 TABLET ORAL EVERY 6 HOURS PRN
COMMUNITY

## 2022-11-02 RX ORDER — PREDNISONE 10 MG/1
TABLET ORAL
Qty: 21 TABLET | Refills: 0 | Status: SHIPPED | OUTPATIENT
Start: 2022-11-02 | End: 2022-11-30

## 2022-11-02 NOTE — PROGRESS NOTES
Subjective:     Patient ID: Ingrid Zelaya is a 47 y.o. female.    Chief Complaint:  Bilateral wrist pain, bilateral thumb pain  History of Present Illness  Ingrid Zelaya 47-year-old female presents to clinic for evaluation of bilateral hand pain.  Maximal tenderness present the base of the first digit does radiate into the forearm but also present along the palmar aspect at the base of the first digit.  Left hand pain greater than that of the right.  Denies known injury.  She was fitted with wrist immobilizer with thumb spica which she has continued to wear which is provided her with slight symptom improvement.  Describes pain as moderate in nature again greater left than the right.  Pain is described as throbbing, stabbing, shooting, aching.  She does feel as if the thumbs getting give way.  Has tried bracing and anti-inflammatory for the last 1-1/2 weeks.  Denies any prior surgical intervention.  Denies any other concerns present this time.     Social History     Occupational History   • Not on file   Tobacco Use   • Smoking status: Former     Packs/day: 1.50     Types: Cigarettes   • Smokeless tobacco: Never   Vaping Use   • Vaping Use: Every day   • Substances: Nicotine, Flavoring   • Devices: Refillable tank   • Passive vaping exposure: Yes   Substance and Sexual Activity   • Alcohol use: Yes     Comment: rarely   • Drug use: No   • Sexual activity: Defer      Past Medical History:   Diagnosis Date   • Anemia    • Diverticulitis    • GERD (gastroesophageal reflux disease)    • Hiatal hernia    • Menstrual abnormality     SCHEDULED FOR SX   • Migraine    • Ulcer of abdomen wall (HCC)    • UTI (urinary tract infection)      Past Surgical History:   Procedure Laterality Date   • CHOLECYSTECTOMY N/A 9/23/2016    Procedure: CHOLECYSTECTOMY LAPAROSCOPIC;  Surgeon: Cordell Dejesus MD;  Location: [facility];  Service:    • COLONOSCOPY N/A 11/14/2017    Procedure: COLONOSCOPY,   polypectomy ;  Surgeon: Anca Kay,  ;  Location: [facility] OR;  Service:    • COLONOSCOPY N/A 6/23/2020    Procedure: Colonoscopy with possible biopsy or polypectomy;  Surgeon: Anca Kay DO;  Location: [facility] OR;  Service: Gastroenterology;  Laterality: N/A;  SIGMOID POLYP  HEPATIC FLEXURE POLYP  DIVERTICULOSIS     • D & C HYSTEROSCOPY MYOSURE N/A 8/10/2016    Procedure: DILATATION AND CURETTAGE HYSTEROSCOPY ;  Surgeon: Frank Alvarado MD;  Location: [facility] OR;  Service:    • DIAGNOSTIC LAPAROSCOPY     • ECTOPIC PREGNANCY SURGERY     • ENDOSCOPY N/A 11/14/2017    Procedure: ESOPHAGOGASTRODUODENOSCOPY  with gastric biopsy for  SHERI TEST, POLYPECTOMY ;  Surgeon: Anca Kay DO;  Location: [facility] OR;  Service:    • ENDOSCOPY N/A 6/23/2020    Procedure: Esophagogastroduodenoscopy with possible biopsy, polypectomy, dilation;  Surgeon: Anca Kay DO;  Location: [facility] OR;  Service: Gastroenterology;  Laterality: N/A;  SHERI TEST  HIATAL HERNIA  GE JUNCTION BIOPSY   • FOOT SURGERY Right     x3   • HYSTERECTOMY     • RI LAP, RADICAL HYST W/ TUBE & OV, NODE BX N/A 11/28/2016    Procedure: TOTAL LAPAROSCOPIC HYSTERECTOMY, bilateral salpingectomy, ablation of peritoneal endometriosis;  Surgeon: Frank Alvarado MD;  Location: [facility];  Service: Obstetrics/Gynecology   • TUBAL ABDOMINAL LIGATION         Family History   Problem Relation Age of Onset   • Hypertension Mother    • Cancer Father    • Hypertension Father    • Breast cancer Cousin                Objective:  Physical Exam    Vital signs reviewed.   General: No acute distress.  Eyes: conjunctiva clear; pupils equally round and reactive  ENT: external ears and nose atraumatic; oropharynx clear  CV: no peripheral edema  Resp: normal respiratory effort  Skin: no rashes or wounds; normal turgor  Psych: mood and affect appropriate; recent and remote memory intact    Vitals:    11/02/22 1458   BP: 114/78   Pulse: 84   Weight: 67 kg (147 lb 9.6 oz)   Height: 168.9  "cm (66.5\")         11/02/22  1458   Weight: 67 kg (147 lb 9.6 oz)     Body mass index is 23.47 kg/m².      Right Hand Exam     Tenderness   The patient is experiencing tenderness in the radial area.    Range of Motion   Wrist   Extension: 45   Flexion: 80   Pronation: 80   Supination: 80     Tests   Tinel's sign (median nerve): negative    Other   Erythema: absent  Sensation: normal  Pulse: present    Comments:  Positive tenderness CMC joint to palpate  Positive grind test  Negative triggering along the flexor tendon of the first digit  Negative Tinel's at median nerve  Minimally positive Finkelstein's test  2+ distal radius pulse  Negative tenderness anatomical snuffbox      Left Hand Exam     Tenderness   The patient is experiencing tenderness in the radial area.     Range of Motion   Wrist   Extension: 45   Flexion: 80   Pronation: 80   Supination: 70     Other   Erythema: absent  Sensation: normal  Pulse: present    Comments:  Positive tenderness CMC joint to palpate  Positive grind test  Negative triggering along the flexor tendon  Minimally positive Finkelstein's test, negative Tinel's  2+ distal radius pulse  Negative tenderness anatomical snuffbox               Imaging:  Bilateral Wrist X-Ray  Indication: Pain  AP, Lateral, and Oblique views    Findings:  No fracture  No bony lesion  Normal soft tissues  Normal joint spaces    No prior studies were available for comparison.    Assessment:        1. Left wrist pain    2. Bilateral thumb pain           Plan:  1. Discussed plan of care with patient.  I do suspect greater issues with the CMC joint compared to de Quervain's tenosynovitis.  Discussed treatment options including corticosteroid injection CMC joint versus oral prednisone followed by oral anti-inflammatory for 2 weeks.  Again patient does have gastroparesis stomach protection is the most therefore will start oral NSAIDs but limited use due to stomach irritation.  I do recommend she continue with the " wrist immobilizer with thumb spica encouraged at night the left upper extremity so that she can use the upper extremities during the day.  Discussed heat bilateral upper extremities at the thumbs.  She may lift with work.  We will plan to see her back in clinic in 4 weeks to reevaluate.  All questions answered.  Orders:  Orders Placed This Encounter   Procedures   • XR Wrist 3+ View Bilateral     New Medications Ordered This Visit   Medications   • predniSONE (DELTASONE) 10 MG tablet     Si mg daily x 3 days, 20 mg daily x 3 days, 10 mg daily x 3 days     Dispense:  21 tablet     Refill:  0   • meloxicam (MOBIC) 15 MG tablet     Sig: Take 1 tablet by mouth Daily.     Dispense:  30 tablet     Refill:  0           I ordered and reviewed the LUCIA today.       Dragon Dictation utilized.

## 2022-11-03 PROBLEM — M79.644 BILATERAL THUMB PAIN: Status: ACTIVE | Noted: 2022-11-03

## 2022-11-03 PROBLEM — M79.645 BILATERAL THUMB PAIN: Status: ACTIVE | Noted: 2022-11-03

## 2022-11-30 ENCOUNTER — OFFICE VISIT (OUTPATIENT)
Dept: ORTHOPEDIC SURGERY | Facility: CLINIC | Age: 47
End: 2022-11-30

## 2022-11-30 VITALS — WEIGHT: 147 LBS | HEIGHT: 67 IN | BODY MASS INDEX: 23.07 KG/M2

## 2022-11-30 DIAGNOSIS — M18.12 ARTHRITIS OF CARPOMETACARPAL (CMC) JOINT OF LEFT THUMB: Primary | ICD-10-CM

## 2022-11-30 PROCEDURE — 20600 DRAIN/INJ JOINT/BURSA W/O US: CPT | Performed by: NURSE PRACTITIONER

## 2022-11-30 PROCEDURE — 99213 OFFICE O/P EST LOW 20 MIN: CPT | Performed by: NURSE PRACTITIONER

## 2022-11-30 RX ADMIN — LIDOCAINE HYDROCHLORIDE 0.5 ML: 10 INJECTION, SOLUTION EPIDURAL; INFILTRATION; INTRACAUDAL; PERINEURAL at 14:53

## 2022-11-30 RX ADMIN — TRIAMCINOLONE ACETONIDE 40 MG: 40 INJECTION, SUSPENSION INTRA-ARTICULAR; INTRAMUSCULAR at 14:53

## 2022-12-02 PROBLEM — M18.12 ARTHRITIS OF CARPOMETACARPAL (CMC) JOINT OF LEFT THUMB: Status: ACTIVE | Noted: 2022-12-02

## 2022-12-02 RX ORDER — TRIAMCINOLONE ACETONIDE 40 MG/ML
40 INJECTION, SUSPENSION INTRA-ARTICULAR; INTRAMUSCULAR
Status: COMPLETED | OUTPATIENT
Start: 2022-11-30 | End: 2022-11-30

## 2022-12-02 RX ORDER — LIDOCAINE HYDROCHLORIDE 10 MG/ML
0.5 INJECTION, SOLUTION EPIDURAL; INFILTRATION; INTRACAUDAL; PERINEURAL
Status: COMPLETED | OUTPATIENT
Start: 2022-11-30 | End: 2022-11-30

## 2023-01-23 ENCOUNTER — OFFICE VISIT (OUTPATIENT)
Dept: ORTHOPEDIC SURGERY | Facility: CLINIC | Age: 48
End: 2023-01-23
Payer: MEDICAID

## 2023-01-23 VITALS — WEIGHT: 147 LBS | BODY MASS INDEX: 23.07 KG/M2 | HEIGHT: 67 IN

## 2023-01-23 DIAGNOSIS — M18.11 ARTHRITIS OF CARPOMETACARPAL (CMC) JOINT OF RIGHT THUMB: Primary | ICD-10-CM

## 2023-01-23 PROCEDURE — 20605 DRAIN/INJ JOINT/BURSA W/O US: CPT | Performed by: NURSE PRACTITIONER

## 2023-01-23 PROCEDURE — 99213 OFFICE O/P EST LOW 20 MIN: CPT | Performed by: NURSE PRACTITIONER

## 2023-01-23 RX ADMIN — LIDOCAINE HYDROCHLORIDE 1 ML: 10 INJECTION, SOLUTION INFILTRATION; PERINEURAL at 16:10

## 2023-01-23 RX ADMIN — TRIAMCINOLONE ACETONIDE 40 MG: 40 INJECTION, SUSPENSION INTRA-ARTICULAR; INTRAMUSCULAR at 16:10

## 2023-01-23 NOTE — PROGRESS NOTES
Presents to clinic for evaluation of her right thumb. She did receive corticosteroid injection left thumb CMC joint with significant symptom relief after approximately 2 days receiving the injection has been able to return to all previously tolerated activities. She is experiencing pain at the base of the first digit CMC joint of the right hand. Prior x-ray images completed pain present with gripping, pulling, lifting with the right upper extremity and repetitive motion. Denies any presence of numbness or tingling. Denies any other concerns present.  Excellent plan:  1. Discussed plan of care with patient. Discussed corticosteroid injection CMC joint of the first digit of the right hand which she does wish to proceed with. Discussed application of ice as needed for swelling discussed heat as needed for pain and gentle motion. Plan to see her back in clinic as needed. All questions answered.

## 2023-01-23 NOTE — PROGRESS NOTES
Subjective:     Patient ID: Ingrid Zelaya is a 47 y.o. female.    Chief Complaint:  Right thumb pain, xray imaging 11/2/2022  History of Present Illness  Ingrid Zelaya Presents to clinic for evaluation of her right thumb.  She did receive corticosteroid injection left thumb CMC joint with significant symptom relief after approximately 2 days receiving the injection has been able to return to all previously tolerated activities.  She is experiencing pain at the base of the first digit CMC joint of the right hand.  Prior x-ray images completed pain present with gripping, pulling, lifting with the right upper extremity and repetitive motion.  Denies any presence of numbness or tingling.  Denies any other concerns present.    Social History     Occupational History   • Not on file   Tobacco Use   • Smoking status: Former     Packs/day: 1.50     Types: Cigarettes   • Smokeless tobacco: Never   Vaping Use   • Vaping Use: Every day   • Substances: Nicotine, Flavoring   • Devices: Refillable tank   • Passive vaping exposure: Yes   Substance and Sexual Activity   • Alcohol use: Yes     Comment: rarely   • Drug use: No   • Sexual activity: Defer      Past Medical History:   Diagnosis Date   • Anemia    • Diverticulitis    • GERD (gastroesophageal reflux disease)    • Hiatal hernia    • Menstrual abnormality     SCHEDULED FOR SX   • Migraine    • Ulcer of abdomen wall (HCC)    • UTI (urinary tract infection)      Past Surgical History:   Procedure Laterality Date   • CHOLECYSTECTOMY N/A 9/23/2016    Procedure: CHOLECYSTECTOMY LAPAROSCOPIC;  Surgeon: Cordell Dejesus MD;  Location: [facility] OR;  Service:    • COLONOSCOPY N/A 11/14/2017    Procedure: COLONOSCOPY,   polypectomy ;  Surgeon: Anca Kay DO;  Location: [facility] OR;  Service:    • COLONOSCOPY N/A 6/23/2020    Procedure: Colonoscopy with possible biopsy or polypectomy;  Surgeon: Anca Kay DO;  Location: [facility] OR;  Service: Gastroenterology;  Laterality:  "N/A;  SIGMOID POLYP  HEPATIC FLEXURE POLYP  DIVERTICULOSIS     • D & C HYSTEROSCOPY MYOSURE N/A 8/10/2016    Procedure: DILATATION AND CURETTAGE HYSTEROSCOPY ;  Surgeon: Frank Alvarado MD;  Location: [facility];  Service:    • DIAGNOSTIC LAPAROSCOPY     • ECTOPIC PREGNANCY SURGERY     • ENDOSCOPY N/A 11/14/2017    Procedure: ESOPHAGOGASTRODUODENOSCOPY  with gastric biopsy for  SHERI TEST, POLYPECTOMY ;  Surgeon: Anca Kya DO;  Location: [facility] OR;  Service:    • ENDOSCOPY N/A 6/23/2020    Procedure: Esophagogastroduodenoscopy with possible biopsy, polypectomy, dilation;  Surgeon: Anca Kay DO;  Location: [facility] OR;  Service: Gastroenterology;  Laterality: N/A;  SHERI TEST  HIATAL HERNIA  GE JUNCTION BIOPSY   • FOOT SURGERY Right     x3   • HYSTERECTOMY     • MT LAPS W/RAD HYST W/BILAT LMPHADEC RMVL TUBE/OVARY N/A 11/28/2016    Procedure: TOTAL LAPAROSCOPIC HYSTERECTOMY, bilateral salpingectomy, ablation of peritoneal endometriosis;  Surgeon: Frank Alvarado MD;  Location: [facility];  Service: Obstetrics/Gynecology   • TUBAL ABDOMINAL LIGATION         Family History   Problem Relation Age of Onset   • Hypertension Mother    • Cancer Father    • Hypertension Father    • Breast cancer Cousin                Objective:  Physical Exam    General: No acute distress.  Eyes: conjunctiva clear; pupils equally round and reactive  ENT: external ears and nose atraumatic; oropharynx clear  CV: no peripheral edema  Resp: normal respiratory effort  Skin: no rashes or wounds; normal turgor  Psych: mood and affect appropriate; recent and remote memory intact    Vitals:    01/23/23 1608   Weight: 66.7 kg (147 lb)   Height: 168.9 cm (66.5\")         01/23/23  1608   Weight: 66.7 kg (147 lb)     Body mass index is 23.37 kg/m².      Right Hand Exam     Other   Erythema: absent  Sensation: normal  Pulse: present    Comments:  Positive grind test CMC joint right hand  2+ distal radius pulse  Flex/extend " all digits right hand  Brisk cap refill all digits right hand           Assessment:        1. Arthritis of carpometacarpal (CMC) joint of right thumb           Plan:  1. 1.  Discussed plan of care with patient.  Discussed corticosteroid injection CMC joint of the first digit of the right hand which she does wish to proceed with.  Discussed application of ice as needed for swelling discussed heat as needed for pain and gentle motion.  Plan to see her back in clinic as needed.  All questions answered.    Medium Joint Arthrocentesis  Date/Time: 1/23/2023 4:10 PM  Consent given by: patient  Site marked: site marked  Timeout: Immediately prior to procedure a time out was called to verify the correct patient, procedure, equipment, support staff and site/side marked as required   Supporting Documentation  Indications: pain   Procedure Details  Location: wrist - Wrist joint: R CMC JOINT.  Preparation: Patient was prepped and draped in the usual sterile fashion  Needle size: 22 G  Medications administered: 1 mL lidocaine 1 %; 40 mg triamcinolone acetonide 40 MG/ML  Patient tolerance: patient tolerated the procedure well with no immediate complications        Orders:  Orders Placed This Encounter   Procedures   • Medium Joint Arthrocentesis     No orders of the defined types were placed in this encounter.        Dragon dictation utilized.

## 2023-01-24 PROBLEM — M18.11 ARTHRITIS OF CARPOMETACARPAL (CMC) JOINT OF RIGHT THUMB: Status: ACTIVE | Noted: 2023-01-24

## 2023-01-24 RX ORDER — TRIAMCINOLONE ACETONIDE 40 MG/ML
40 INJECTION, SUSPENSION INTRA-ARTICULAR; INTRAMUSCULAR
Status: COMPLETED | OUTPATIENT
Start: 2023-01-23 | End: 2023-01-23

## 2023-01-24 RX ORDER — LIDOCAINE HYDROCHLORIDE 10 MG/ML
1 INJECTION, SOLUTION INFILTRATION; PERINEURAL
Status: COMPLETED | OUTPATIENT
Start: 2023-01-23 | End: 2023-01-23

## 2023-02-13 ENCOUNTER — TELEPHONE (OUTPATIENT)
Dept: SURGERY | Facility: CLINIC | Age: 48
End: 2023-02-13

## 2023-02-13 NOTE — TELEPHONE ENCOUNTER
Caller: [email or web address]     Relationship to patient:     Best call back number:[phone]     Patient is needing: NEEDING A COLONOSCOPY REPORT - FAX NUMBER   [phone]

## 2023-04-04 ENCOUNTER — TELEPHONE (OUTPATIENT)
Dept: SURGERY | Facility: CLINIC | Age: 48
End: 2023-04-04
Payer: MEDICAID

## 2023-05-11 ENCOUNTER — OFFICE VISIT (OUTPATIENT)
Dept: ORTHOPEDIC SURGERY | Facility: CLINIC | Age: 48
End: 2023-05-11
Payer: MEDICAID

## 2023-05-11 VITALS — WEIGHT: 151.6 LBS | BODY MASS INDEX: 23.79 KG/M2 | HEIGHT: 67 IN

## 2023-05-11 DIAGNOSIS — R52 PAIN: ICD-10-CM

## 2023-05-11 DIAGNOSIS — M18.12 ARTHRITIS OF CARPOMETACARPAL (CMC) JOINT OF LEFT THUMB: Primary | ICD-10-CM

## 2023-05-11 DIAGNOSIS — M94.261 CHONDROMALACIA, KNEE, RIGHT: ICD-10-CM

## 2023-05-11 DIAGNOSIS — M94.262 CHONDROMALACIA, KNEE, LEFT: ICD-10-CM

## 2023-05-11 DIAGNOSIS — M18.11 ARTHRITIS OF CARPOMETACARPAL (CMC) JOINT OF RIGHT THUMB: ICD-10-CM

## 2023-05-11 RX ORDER — LIDOCAINE HYDROCHLORIDE 10 MG/ML
1 INJECTION, SOLUTION EPIDURAL; INFILTRATION; INTRACAUDAL; PERINEURAL
Status: COMPLETED | OUTPATIENT
Start: 2023-05-11 | End: 2023-05-11

## 2023-05-11 RX ORDER — TRIAMCINOLONE ACETONIDE 40 MG/ML
40 INJECTION, SUSPENSION INTRA-ARTICULAR; INTRAMUSCULAR
Status: COMPLETED | OUTPATIENT
Start: 2023-05-11 | End: 2023-05-11

## 2023-05-11 RX ORDER — SUMATRIPTAN 25 MG/1
TABLET, FILM COATED ORAL
COMMUNITY
Start: 2023-04-21

## 2023-05-11 RX ADMIN — LIDOCAINE HYDROCHLORIDE 1 ML: 10 INJECTION, SOLUTION EPIDURAL; INFILTRATION; INTRACAUDAL; PERINEURAL at 15:26

## 2023-05-11 RX ADMIN — TRIAMCINOLONE ACETONIDE 40 MG: 40 INJECTION, SUSPENSION INTRA-ARTICULAR; INTRAMUSCULAR at 15:26

## 2023-05-11 NOTE — PROGRESS NOTES
Subjective:     Patient ID: Ingrid Zelaya is a 47 y.o. female.    Chief Complaint:  DJD CMC joint bilaterally, left thumb pain greater than that of the right  Right CMC joint corticosteroid injection 1/23/2023  History of Present Illness  Ingrid Zelaya returns to clinic for follow-up bilateral hands.  She received corticosteroid injection right CMC joint 1/23/2023 which did help significantly decrease her pain.  She is experiencing pain now present left side greater than the right.  Has received prior corticosteroid injection right thumb with 100% symptom relief.  She is experiencing pain with lifting, pulling, pushing is continue bracing.  Pain worse with activity minimal symptom relief with rest she is experiencing swelling at the base of the thumb CMC joint left hand.  She is also experiencing pain is radiating up into her wrist.  Denies any presence of numbness or tingling.  Would also like to be seen for pain she is experiencing bilateral knees.  Pain on and off for the last 3 to 4 years but over the last few months has noted the knees giving way.  Maximal tenderness bilaterally along the medial compartment as well as the anterior aspect.  Pain worse with ascending descending stairs.  Denies any swelling does experience some stiffness and at the most rates discomfort an 8 out of a 10 when the pain is severe and at its worst.  Again pain on and off for several years has not had any prior x-ray, MRI, CT.  Denies any prior home exercise program, physical therapy steroid injections or any other treatment.  Denies any other concerns present.      Social History     Occupational History   • Not on file   Tobacco Use   • Smoking status: Former     Packs/day: 1.50     Types: Cigarettes   • Smokeless tobacco: Never   Vaping Use   • Vaping Use: Every day   • Substances: Nicotine, Flavoring   • Devices: RefSitesimonble tank   • Passive vaping exposure: Yes   Substance and Sexual Activity   • Alcohol use: Yes     Comment: rarely    • Drug use: No   • Sexual activity: Defer      Past Medical History:   Diagnosis Date   • Anemia    • Diverticulitis    • GERD (gastroesophageal reflux disease)    • Hiatal hernia    • Menstrual abnormality     SCHEDULED FOR SX   • Migraine    • Ulcer of abdomen wall    • UTI (urinary tract infection)      Past Surgical History:   Procedure Laterality Date   • CHOLECYSTECTOMY N/A 9/23/2016    Procedure: CHOLECYSTECTOMY LAPAROSCOPIC;  Surgeon: Cordell Dejesus MD;  Location: [facility] OR;  Service:    • COLONOSCOPY N/A 11/14/2017    Procedure: COLONOSCOPY,   polypectomy ;  Surgeon: Anca Kay DO;  Location: [facility] OR;  Service:    • COLONOSCOPY N/A 6/23/2020    Procedure: Colonoscopy with possible biopsy or polypectomy;  Surgeon: Anca Kay DO;  Location: [facility] OR;  Service: Gastroenterology;  Laterality: N/A;  SIGMOID POLYP  HEPATIC FLEXURE POLYP  DIVERTICULOSIS     • D & C HYSTEROSCOPY MYOSURE N/A 8/10/2016    Procedure: DILATATION AND CURETTAGE HYSTEROSCOPY ;  Surgeon: Frank Alvarado MD;  Location: [facility] OR;  Service:    • DIAGNOSTIC LAPAROSCOPY     • ECTOPIC PREGNANCY SURGERY     • ENDOSCOPY N/A 11/14/2017    Procedure: ESOPHAGOGASTRODUODENOSCOPY  with gastric biopsy for  SHERI TEST, POLYPECTOMY ;  Surgeon: Anca Kay DO;  Location: [facility] OR;  Service:    • ENDOSCOPY N/A 6/23/2020    Procedure: Esophagogastroduodenoscopy with possible biopsy, polypectomy, dilation;  Surgeon: Anca Kay DO;  Location: [facility] OR;  Service: Gastroenterology;  Laterality: N/A;  SHERI TEST  HIATAL HERNIA  GE JUNCTION BIOPSY   • FOOT SURGERY Right     x3   • HYSTERECTOMY     • DC LAPS W/RAD HYST W/BILAT LMPHADEC RMVL TUBE/OVARY N/A 11/28/2016    Procedure: TOTAL LAPAROSCOPIC HYSTERECTOMY, bilateral salpingectomy, ablation of peritoneal endometriosis;  Surgeon: Frank Alvarado MD;  Location: [facility];  Service: Obstetrics/Gynecology   • TUBAL ABDOMINAL LIGATION         Family  "History   Problem Relation Age of Onset   • Hypertension Mother    • Cancer Father    • Hypertension Father    • Breast cancer Cousin                Objective:  Physical Exam  General: No acute distress.  Eyes: conjunctiva clear; pupils equally round and reactive  ENT: external ears and nose atraumatic; oropharynx clear  CV: no peripheral edema  Resp: normal respiratory effort  Skin: no rashes or wounds; normal turgor  Psych: mood and affect appropriate; recent and remote memory intact    Vitals:    05/11/23 1455   Weight: 68.8 kg (151 lb 9.6 oz)   Height: 168.9 cm (66.5\")         05/11/23  1455   Weight: 68.8 kg (151 lb 9.6 oz)     Body mass index is 24.1 kg/m².      Right Knee Exam     Tenderness   The patient is experiencing tenderness in the medial joint line and patella.    Range of Motion   Extension: 0   Flexion: 130     Tests   Silver:  Medial - negative Lateral - negative  Varus: negative Valgus: negative  Lachman:  Anterior - 1+      Drawer:  Anterior - negative      Patellar apprehension: positive (Minimal)    Other   Erythema: absent  Sensation: normal  Pulse: present  Swelling: mild  Effusion: no effusion present    Comments:  Minimally positive active patellar compression test  Quad strength 4 out of 5      Left Knee Exam     Tenderness   The patient is experiencing tenderness in the medial joint line and patella.    Range of Motion   Extension: 0   Flexion: 130     Tests   Silver:  Medial - negative Lateral - negative  Varus: negative Valgus: negative  Lachman:  Anterior - 1+      Drawer:  Anterior - negative       Patellar apprehension: positive    Other   Erythema: absent  Sensation: normal  Pulse: present  Swelling: mild  Effusion: no effusion present    Comments:  Minimally positive active patellar compression test  Quad strength 4 out of 5      Right Hand Exam     Tests   Tinel's sign (median nerve): negative  Finkelstein's test: positive    Comments:  Positive tenderness CMC joint both " dorsal and palmar aspect of the hand  Grind test  Positive sensation the dorsum and palmar aspect of the hand including all digits  2+ distal radius pulse  No swelling noted      Left Hand Exam     Tests   Tinel's sign (median nerve): negative  Finkelstein's test: positive    Comments:  BasedPositive tenderness CMC joint both dorsal and palmar aspect of the  Grind test  Positive sensation the dorsum and palmar aspect of the hand including all digits  2+ distal radius pulse  Mild swelling noted                 Imaging:  Bilateral Knee X-Ray  Indication: Pain    AP, Lateral, and [place] views    Findings:  No fracture  No bony lesion  Normal soft tissues  Mild to moderate patellofemoral narrowing right reactive osteophytes patellofemoral joint, mild mild reactive osteophyte present left, mild narrowing medial compartment bilaterally greater left with reactive osteophyte noted medial femoral condyle no other acute osseous abnormality identified on imaging    No prior studies were available for comparison.    Assessment:        1. Pain    2. Arthritis of carpometacarpal (CMC) joint of right thumb    3. Arthritis of carpometacarpal (CMC) joint of left thumb    4. Chondromalacia, knee, left    5. Chondromalacia, knee, right           Plan:     Medium Joint Arthrocentesis  Date/Time: 5/11/2023 3:26 PM  Consent given by: patient  Site marked: site marked  Timeout: Immediately prior to procedure a time out was called to verify the correct patient, procedure, equipment, support staff and site/side marked as required   Supporting Documentation  Indications: pain   Procedure Details  Location: wrist (jovan wrist) - Wrist joint: cmc.  Preparation: Patient was prepped and draped in the usual sterile fashion  Medications administered: 40 mg triamcinolone acetonide 40 MG/ML; 1 mL lidocaine PF 1% 1 %  Patient tolerance: patient tolerated the procedure well with no immediate complications            1. Discussed plan of care with  patient.  She does wish to proceed corticosteroid injection CMC joint bilateral thumbs.  Do recommend application of ice at injection site this evening.  2. We will provide her home strengthening exercises for the quads, hamstrings, calf muscles bilateral knees.  If pain continues to be present gladly see her back in clinic.  All questions answered.  Orders:  Orders Placed This Encounter   Procedures   • Medium Joint Arthrocentesis   • XR Knee 3 View Bilateral     No orders of the defined types were placed in this encounter.            Dragon dictation utilized

## 2023-11-03 ENCOUNTER — OFFICE VISIT (OUTPATIENT)
Dept: ORTHOPEDIC SURGERY | Facility: CLINIC | Age: 48
End: 2023-11-03
Payer: MEDICAID

## 2023-11-03 DIAGNOSIS — M18.11 ARTHRITIS OF CARPOMETACARPAL (CMC) JOINT OF RIGHT THUMB: Primary | ICD-10-CM

## 2023-11-03 DIAGNOSIS — M25.531 RIGHT WRIST PAIN: ICD-10-CM

## 2023-11-03 RX ORDER — SCOLOPAMINE TRANSDERMAL SYSTEM 1 MG/1
1 PATCH, EXTENDED RELEASE TRANSDERMAL
COMMUNITY
Start: 2023-10-24

## 2023-11-03 RX ORDER — LIDOCAINE HYDROCHLORIDE 10 MG/ML
1 INJECTION, SOLUTION EPIDURAL; INFILTRATION; INTRACAUDAL; PERINEURAL
Status: COMPLETED | OUTPATIENT
Start: 2023-11-03 | End: 2023-11-03

## 2023-11-03 RX ORDER — TRIAMCINOLONE ACETONIDE 40 MG/ML
1 INJECTION, SUSPENSION INTRA-ARTICULAR; INTRAMUSCULAR
Status: COMPLETED | OUTPATIENT
Start: 2023-11-03 | End: 2023-11-03

## 2023-11-03 RX ADMIN — TRIAMCINOLONE ACETONIDE 1 MG: 40 INJECTION, SUSPENSION INTRA-ARTICULAR; INTRAMUSCULAR at 16:10

## 2023-11-03 RX ADMIN — LIDOCAINE HYDROCHLORIDE 1 ML: 10 INJECTION, SOLUTION EPIDURAL; INFILTRATION; INTRACAUDAL; PERINEURAL at 16:10

## 2023-11-03 NOTE — PROGRESS NOTES
Subjective:     Patient ID: Ingrid Zelaya is a 48 y.o. female.    Chief Complaint:  Follow-up DJD CMC joint right thumb  Acute onset recurrent pain  History of Present Illness  Ingrid Zelaya presents to clinic today for evaluation of her right hand she was pushing against a corner when she began experiencing pain along the CMC joint of the first digit right hand and also experiencing pain now along the distal radius and distal ulnar aspect.  Rates discomfort an 8 out of a 10 aching throbbing sharp shooting in nature worse with motion mild symptom relief with rest.  Maximal tenderness continues to be present along the CMC joint denies any presence of numbness or tingling the upper extremity.  Denies any other concerns present.     Social History     Occupational History    Not on file   Tobacco Use    Smoking status: Former     Packs/day: 1.5     Types: Cigarettes    Smokeless tobacco: Never   Vaping Use    Vaping Use: Every day    Substances: Nicotine, Flavoring    Devices: Refillable tank    Passive vaping exposure: Yes   Substance and Sexual Activity    Alcohol use: Yes     Comment: rarely    Drug use: No    Sexual activity: Defer      Past Medical History:   Diagnosis Date    Anemia     Diverticulitis     GERD (gastroesophageal reflux disease)     Hiatal hernia     Menstrual abnormality     SCHEDULED FOR SX    Migraine     Ulcer of abdomen wall     UTI (urinary tract infection)      Past Surgical History:   Procedure Laterality Date    CHOLECYSTECTOMY N/A 9/23/2016    Procedure: CHOLECYSTECTOMY LAPAROSCOPIC;  Surgeon: Cordell Dejesus MD;  Location: [facility] OR;  Service:     COLONOSCOPY N/A 11/14/2017    Procedure: COLONOSCOPY,   polypectomy ;  Surgeon: Anca Kay DO;  Location: [facility] OR;  Service:     COLONOSCOPY N/A 6/23/2020    Procedure: Colonoscopy with possible biopsy or polypectomy;  Surgeon: Anca Kay DO;  Location: [facility] OR;  Service: Gastroenterology;  Laterality: N/A;  SIGMOID  POLYP  HEPATIC FLEXURE POLYP  DIVERTICULOSIS      D & C HYSTEROSCOPY MYOSURE N/A 8/10/2016    Procedure: DILATATION AND CURETTAGE HYSTEROSCOPY ;  Surgeon: Frank Alvarado MD;  [facility];  Service:     DIAGNOSTIC LAPAROSCOPY      ECTOPIC PREGNANCY SURGERY      ENDOSCOPY N/A 11/14/2017    Procedure: ESOPHAGOGASTRODUODENOSCOPY  with gastric biopsy for  SHERI TEST, POLYPECTOMY ;  Surgeon: Anca Kay DO;  [facility];  Service:     ENDOSCOPY N/A 6/23/2020    Procedure: Esophagogastroduodenoscopy with possible biopsy, polypectomy, dilation;  Surgeon: Anca Kay DO;  [facility];  Service: Gastroenterology;  Laterality: N/A;  SHERI TEST  HIATAL HERNIA  GE JUNCTION BIOPSY    FOOT SURGERY Right     x3    HYSTERECTOMY      MD LAPS W/RAD HYST W/BILAT LMPHADEC RMVL TUBE/OVARY N/A 11/28/2016    Procedure: TOTAL LAPAROSCOPIC HYSTERECTOMY, bilateral salpingectomy, ablation of peritoneal endometriosis;  Surgeon: Frank Alvarado MD;  [facility];  Service: Obstetrics/Gynecology    TUBAL ABDOMINAL LIGATION         Family History   Problem Relation Age of Onset    Hypertension Mother     Cancer Father     Hypertension Father     Breast cancer Cousin                Objective:  Physical Exam  General: No acute distress.  Eyes: conjunctiva clear; pupils equally round and reactive  ENT: external ears and nose atraumatic; oropharynx clear  CV: no peripheral edema  Resp: normal respiratory effort  Skin: no rashes or wounds; normal turgor  Psych: mood and affect appropriate; recent and remote memory intact    There were no vitals filed for this visit.  There were no vitals filed for this visit.  There is no height or weight on file to calculate BMI.      Right Hand Exam     Tenderness   The patient is experiencing tenderness in the radial area and ulnar area.    Range of Motion   Wrist   Extension:  45   Flexion:  80   Pronation:  80   Supination:  80     Tests   Phalen’s  Sign: negative  Tinel's sign (median nerve): negative  Finkelstein's test: positive    Other   Erythema: absent  Sensation: normal  Pulse: present    Comments:  Positive grind test CMC joint first digit mild swelling CMC joint first digit                 Imaging:  Right Hand X-Ray  Indication: Pain  AP, Lateral, and Oblique views    Findings:  No fracture  No bony lesion  Normal soft tissues  Normal joint spaces  Soft tissue swelling along the ulnar styloid no evidence of acute fracture dislocation or other acute osseous abnormality, swelling also noted CMC joint no significant progression of chondromalacia previously noted on prior x-ray imaging  Prior studies were available for comparison.    Assessment:        1. Right wrist pain    2. Arthritis of carpometacarpal (CMC) joint of right thumb           Plan:  Medium Joint Arthrocentesis  Date/Time: 11/3/2023 4:10 PM  Consent given by: patient  Site marked: site marked  Timeout: Immediately prior to procedure a time out was called to verify the correct patient, procedure, equipment, support staff and site/side marked as required   Supporting Documentation  Indications: pain   Procedure Details  Location: wrist - Wrist joint: cmc.  Preparation: Patient was prepped and draped in the usual sterile fashion  Needle size: 22 G  Approach: right cmc joint.  Medications administered: 1 mg triamcinolone acetonide 40 MG/ML; 1 mL lidocaine PF 1% 1 %  Patient tolerance: patient tolerated the procedure well with no immediate complications        Discussed plan of care with patient.  We did discuss proceeding with corticosteroid injection CMC joint right wrist I do recommend a patient of ice injection site this evening can repeat injection 3 months if needed encouraged to call with any questions or concerns gladly see her sooner.  All questions answered.  Orders:  Orders Placed This Encounter   Procedures    Medium Joint Arthrocentesis    XR Wrist 3+ View Right     No orders of  the defined types were placed in this encounter.          Dragon dictation utilized

## 2024-06-12 ENCOUNTER — OFFICE VISIT (OUTPATIENT)
Dept: ORTHOPEDIC SURGERY | Facility: CLINIC | Age: 49
End: 2024-06-12
Payer: MEDICAID

## 2024-06-12 VITALS — BODY MASS INDEX: 22.95 KG/M2 | WEIGHT: 146.2 LBS | HEIGHT: 67 IN

## 2024-06-12 DIAGNOSIS — M18.11 ARTHRITIS OF CARPOMETACARPAL (CMC) JOINT OF RIGHT THUMB: ICD-10-CM

## 2024-06-12 DIAGNOSIS — M18.12 ARTHRITIS OF CARPOMETACARPAL (CMC) JOINT OF LEFT THUMB: Primary | ICD-10-CM

## 2024-06-12 RX ORDER — TOPIRAMATE 50 MG/1
TABLET, FILM COATED ORAL
COMMUNITY
Start: 2024-05-24

## 2024-06-12 RX ORDER — SUMATRIPTAN 100 MG/1
100 TABLET, FILM COATED ORAL
COMMUNITY
Start: 2024-05-31

## 2024-06-12 RX ADMIN — TRIAMCINOLONE ACETONIDE 40 MG: 40 INJECTION, SUSPENSION INTRA-ARTICULAR; INTRAMUSCULAR at 09:07

## 2024-06-12 RX ADMIN — LIDOCAINE HYDROCHLORIDE 1 ML: 10 INJECTION, SOLUTION EPIDURAL; INFILTRATION; INTRACAUDAL; PERINEURAL at 09:07

## 2024-06-12 NOTE — PROGRESS NOTES
Subjective:     Patient ID: Ingrid Zelaya is a 48 y.o. female.    Chief Complaint:  Follow-up DJD CMC joint bilaterally  History of Present Illness  History of Present Illness    Ingrid Zelaya Returns to clinic today for follow-up bilateral hands she is experiencing pain at the base of bilateral wrist.  Received corticosteroid injections last in November 2023 with significant symptom improvement after receiving injections and symptoms have returned.  Denies known injury she is experiencing pain with any motion of the thumb and with hitting around the joint denies any presence of numbness or tingling bilateral upper extremities.  Denies any other concerns present.       Social History     Occupational History    Not on file   Tobacco Use    Smoking status: Former     Current packs/day: 1.50     Types: Cigarettes    Smokeless tobacco: Never   Vaping Use    Vaping status: Every Day    Substances: Nicotine, Flavoring    Devices: Refillable tank    Passive vaping exposure: Yes   Substance and Sexual Activity    Alcohol use: Yes     Comment: rarely    Drug use: No    Sexual activity: Defer      Past Medical History:   Diagnosis Date    Anemia     Diverticulitis     GERD (gastroesophageal reflux disease)     Hiatal hernia     Menstrual abnormality     SCHEDULED FOR SX    Migraine     Ulcer of abdomen wall     UTI (urinary tract infection)      Past Surgical History:   Procedure Laterality Date    CHOLECYSTECTOMY N/A 9/23/2016    Procedure: CHOLECYSTECTOMY LAPAROSCOPIC;  Surgeon: Cordell Dejesus MD;  Location: [facility] OR;  Service:     COLONOSCOPY N/A 11/14/2017    Procedure: COLONOSCOPY,   polypectomy ;  Surgeon: Anca Kay DO;  Location: [facility] OR;  Service:     COLONOSCOPY N/A 6/23/2020    Procedure: Colonoscopy with possible biopsy or polypectomy;  Surgeon: Anca Kay DO;  Location: [facility] OR;  Service: Gastroenterology;  Laterality: N/A;  SIGMOID POLYP  HEPATIC FLEXURE POLYP  DIVERTICULOSIS      D & C  "HYSTEROSCOPY MYOSURE N/A 8/10/2016    Procedure: DILATATION AND CURETTAGE HYSTEROSCOPY ;  Surgeon: Frank Alvarado MD;  [facility]: [facility];  Service:     DIAGNOSTIC LAPAROSCOPY      ECTOPIC PREGNANCY SURGERY      ENDOSCOPY N/A 11/14/2017    Procedure: ESOPHAGOGASTRODUODENOSCOPY  with gastric biopsy for  SHERI TEST, POLYPECTOMY ;  Surgeon: Anca Kay DO;  Location: [facility];  Service:     ENDOSCOPY N/A 6/23/2020    Procedure: Esophagogastroduodenoscopy with possible biopsy, polypectomy, dilation;  Surgeon: Anca Kay DO;  [facility]: [facility];  Service: Gastroenterology;  Laterality: N/A;  SHERI TEST  HIATAL HERNIA  GE JUNCTION BIOPSY    FOOT SURGERY Right     x3    HYSTERECTOMY      KS LAPS W/RAD HYST W/BILAT LMPHADEC RMVL TUBE/OVARY N/A 11/28/2016    Procedure: TOTAL LAPAROSCOPIC HYSTERECTOMY, bilateral salpingectomy, ablation of peritoneal endometriosis;  Surgeon: Frank Alvarado MD;  Location: [facility];  Service: Obstetrics/Gynecology    TUBAL ABDOMINAL LIGATION         Family History   Problem Relation Age of Onset    Hypertension Mother     Cancer Father     Hypertension Father     Breast cancer Cousin                Objective:  Physical Exam  General: No acute distress.  Eyes: conjunctiva clear; pupils equally round and reactive  ENT: external ears and nose atraumatic; oropharynx clear  CV: no peripheral edema  Resp: normal respiratory effort  Skin: no rashes or wounds; normal turgor  Psych: mood and affect appropriate; recent and remote memory intact    Vitals:    06/12/24 0852   Weight: 66.3 kg (146 lb 3.2 oz)   Height: 168.9 cm (66.5\")         06/12/24  0852   Weight: 66.3 kg (146 lb 3.2 oz)     Body mass index is 23.24 kg/m².      Ortho Exam     Physical Exam  Bilateral thumbs examined  Positive tenderness along the CMC joint  Positive grind test CMC joint first digit bilateral hands  Positive sensation palmar and dorsum of the hand  Flexor/extend all digits bilateral " hands  Mild swelling bilateral thumbs  Brisk cap refill all digits bilateral hands  2+ distal radius pulse    Imaging:  Left hand X-Ray  Indication: Pain  AP, Lateral, and Oblique views    Findings:  No fracture  No bony lesion  Normal soft tissues  Mild narrowing along the CMC joint first digit    Prior studies were available for comparison.    Assessment:        1. Arthritis of carpometacarpal (CMC) joint of left thumb    2. Arthritis of carpometacarpal (CMC) joint of right thumb         Assessment & Plan      Plan:  1.  Discussed plan of care with patient and family.  She does wish to proceed with repeat corticosteroid injections bilateral thumbs.  I do recommend application of ice injection site this evening.  We can repeat the injection again at 3-month interval.  I will have her call for follow-up.  All questions answered.  - Small Joint Arthrocentesis: bilateral thumb CMC on 6/12/2024 9:07 AM  Indications: pain  Details: 22 G needle, lateral approach  Medications (Right): 1 mL lidocaine PF 1% 1 %; 40 mg triamcinolone acetonide 40 MG/ML  Medications (Left): 1 mL lidocaine PF 1% 1 %; 40 mg triamcinolone acetonide 40 MG/ML  Outcome: tolerated well, no immediate complications  Procedure, treatment alternatives, risks and benefits explained, specific risks discussed. Consent was given by the patient. Immediately prior to procedure a time out was called to verify the correct patient, procedure, equipment, support staff and site/side marked as required. Patient was prepped and draped in the usual sterile fashion.           Orders:  Orders Placed This Encounter   Procedures    - Small Joint Arthrocentesis: bilateral thumb CMC    XR Wrist 3+ View Left     No orders of the defined types were placed in this encounter.        Dragon dictation utilized

## 2024-06-13 RX ORDER — TRIAMCINOLONE ACETONIDE 40 MG/ML
40 INJECTION, SUSPENSION INTRA-ARTICULAR; INTRAMUSCULAR
Status: COMPLETED | OUTPATIENT
Start: 2024-06-12 | End: 2024-06-12

## 2024-06-13 RX ORDER — LIDOCAINE HYDROCHLORIDE 10 MG/ML
1 INJECTION, SOLUTION EPIDURAL; INFILTRATION; INTRACAUDAL; PERINEURAL
Status: COMPLETED | OUTPATIENT
Start: 2024-06-12 | End: 2024-06-12

## 2025-01-31 ENCOUNTER — OFFICE VISIT (OUTPATIENT)
Dept: ORTHOPEDIC SURGERY | Facility: CLINIC | Age: 50
End: 2025-01-31
Payer: MEDICAID

## 2025-01-31 VITALS — BODY MASS INDEX: 22.91 KG/M2 | WEIGHT: 146 LBS | HEIGHT: 67 IN

## 2025-01-31 DIAGNOSIS — M18.11 ARTHRITIS OF CARPOMETACARPAL (CMC) JOINT OF RIGHT THUMB: Primary | ICD-10-CM

## 2025-01-31 RX ORDER — LIDOCAINE HYDROCHLORIDE 10 MG/ML
2 INJECTION, SOLUTION EPIDURAL; INFILTRATION; INTRACAUDAL; PERINEURAL
Status: COMPLETED | OUTPATIENT
Start: 2025-01-31 | End: 2025-01-31

## 2025-01-31 RX ORDER — TRIAMCINOLONE ACETONIDE 40 MG/ML
40 INJECTION, SUSPENSION INTRA-ARTICULAR; INTRAMUSCULAR
Status: COMPLETED | OUTPATIENT
Start: 2025-01-31 | End: 2025-01-31

## 2025-01-31 RX ADMIN — LIDOCAINE HYDROCHLORIDE 2 ML: 10 INJECTION, SOLUTION EPIDURAL; INFILTRATION; INTRACAUDAL; PERINEURAL at 11:35

## 2025-01-31 RX ADMIN — TRIAMCINOLONE ACETONIDE 40 MG: 40 INJECTION, SUSPENSION INTRA-ARTICULAR; INTRAMUSCULAR at 11:35

## 2025-01-31 NOTE — PROGRESS NOTES
Subjective:     Patient ID: Ingrid Zelaya is a 49 y.o. female.    Chief Complaint:  Follow-up DJD CMC joint bilaterally   History of Present Illness  History of Present Illness  The patient is a 49-year-old female who presents to the clinic today for evaluation of bilateral thumb pain.    She was last seen on 06/12/2024 for bilateral thumb pain and received injections, which provided significant symptom improvement. She is experiencing pain along the palmar aspect and the dorsum of the base of the first digit at the CMC joint bilaterally, accompanied by mild swelling at the joints. She reports pain with daily use of her hands but has no other concerns. There is no evidence of chondromalacia along the CMC joint.         Social History     Occupational History    Not on file   Tobacco Use    Smoking status: Former     Current packs/day: 1.50     Types: Cigarettes    Smokeless tobacco: Never   Vaping Use    Vaping status: Every Day    Substances: Nicotine, Flavoring    Devices: Refillable tank    Passive vaping exposure: Yes   Substance and Sexual Activity    Alcohol use: Yes     Comment: rarely    Drug use: No    Sexual activity: Defer      Past Medical History:   Diagnosis Date    Anemia     Diverticulitis     GERD (gastroesophageal reflux disease)     Hiatal hernia     Menstrual abnormality     SCHEDULED FOR SX    Migraine     Ulcer of abdomen wall     UTI (urinary tract infection)      Past Surgical History:   Procedure Laterality Date    CHOLECYSTECTOMY N/A 9/23/2016    Procedure: CHOLECYSTECTOMY LAPAROSCOPIC;  Surgeon: Cordell Dejesus MD;  Location: [facility] OR;  Service:     COLONOSCOPY N/A 11/14/2017    Procedure: COLONOSCOPY,   polypectomy ;  Surgeon: Anca Kay DO;  Location: [facility] OR;  Service:     COLONOSCOPY N/A 6/23/2020    Procedure: Colonoscopy with possible biopsy or polypectomy;  Surgeon: Anca Kay DO;  Location: [facility] OR;  Service: Gastroenterology;  Laterality: N/A;  SIGMOID  "POLYP  HEPATIC FLEXURE POLYP  DIVERTICULOSIS      D & C HYSTEROSCOPY MYOSURE N/A 8/10/2016    Procedure: DILATATION AND CURETTAGE HYSTEROSCOPY ;  Surgeon: Frank Alvarado MD;  Location: [facility] OR;  Service:     DIAGNOSTIC LAPAROSCOPY      ECTOPIC PREGNANCY SURGERY      ENDOSCOPY N/A 11/14/2017    Procedure: ESOPHAGOGASTRODUODENOSCOPY  with gastric biopsy for  SHERI TEST, POLYPECTOMY ;  Surgeon: Anca Kay DO;  Location: [facility] OR;  Service:     ENDOSCOPY N/A 6/23/2020    Procedure: Esophagogastroduodenoscopy with possible biopsy, polypectomy, dilation;  Surgeon: Anca Kay DO;  Location: [facility] OR;  Service: Gastroenterology;  Laterality: N/A;  SHERI TEST  HIATAL HERNIA  GE JUNCTION BIOPSY    FOOT SURGERY Right     x3    HYSTERECTOMY      IA LAPS W/RAD HYST W/BILAT LMPHADEC RMVL TUBE/OVARY N/A 11/28/2016    Procedure: TOTAL LAPAROSCOPIC HYSTERECTOMY, bilateral salpingectomy, ablation of peritoneal endometriosis;  Surgeon: Frank Alvarado MD;  Location: [facility] OR;  Service: Obstetrics/Gynecology    TUBAL ABDOMINAL LIGATION         Family History   Problem Relation Age of Onset    Hypertension Mother     Cancer Father     Hypertension Father     Breast cancer Cousin                Objective:  Physical Exam    General: No acute distress.  Eyes: conjunctiva clear; pupils equally round and reactive  ENT: external ears and nose atraumatic; oropharynx clear  CV: no peripheral edema  Resp: normal respiratory effort  Skin: no rashes or wounds; normal turgor  Psych: mood and affect appropriate; recent and remote memory intact    Vitals:    01/31/25 1059   Weight: 66.2 kg (146 lb)   Height: 168.9 cm (66.5\")         01/31/25  1059   Weight: 66.2 kg (146 lb)     Body mass index is 23.21 kg/m².      Right Hand Exam     Range of Motion   Wrist   Extension:  45   Flexion:  80   Pronation:  80   Supination:  80     Muscle Strength   Wrist extension: 4/5   Wrist flexion: 4/5   : 4/5     Tests "   Tinel's sign (median nerve): negative    Other   Erythema: absent  Sensation: normal  Pulse: present    Comments:  Maximal tenderness to palpate along the CMC joint both palmar aspect of the dorsal aspect of the joint  Positive grind test  Negative Finkelstein test  Positive sensation light touch all distributions the hand including the dorsum and palmar aspect and all digits      Left Hand Exam     Range of Motion   Wrist   Extension:  45   Flexion:  80   Pronation:  80   Supination:  80     Muscle Strength   Wrist extension: 4/5   Wrist flexion: 4/5   :  4/5     Tests   Tinel's sign (median nerve): negative    Other   Erythema: absent  Sensation: normal  Pulse: present    Comments:  Maximal tenderness to palpate along the CMC joint both palmar aspect of the dorsal aspect of the joint  Positive grind test  Negative Finkelstein test  Positive sensation light touch all distributions the hand including the dorsum and palmar aspect and all digits             Physical Exam      Imaging:  Right Hand X-Ray  Indication: Pain  AP, Lateral, and Oblique views    Findings:  No fracture  No bony lesion  Normal soft tissues  Fibrillation along the CMC joint no evidence of advanced arthritis CMC joint     prior studies were available for comparison.    Assessment:        1. Arthritis of carpometacarpal (CMC) joint of right thumb         Assessment & Plan  1. Bilateral thumb pain.  She reports experiencing pain along the palmar aspect and the dorsum of the first base of the first digit at the CMC joint bilaterally, with mild swelling. There is no numbness, tingling, or acute injury. The pain occurs with daily use of her hands. She was previously seen on 06/12/2024, and received injections, which provided significant symptom improvement.    PROCEDURE  The patient received injections for bilateral thumb pain on 06/12/2024, which provided significant symptom improvement.    Plan:  - Small Joint Arthrocentesis: bilateral thumb  [facility] on 1/31/2025 11:35 AM  Indications: pain  Details: 22 G needle, lateral approach  Medications (Right): 40 mg triamcinolone acetonide 40 MG/ML; 2 mL lidocaine PF 1% 1 %  Medications (Left): 40 mg triamcinolone acetonide 40 MG/ML; 2 mL lidocaine PF 1% 1 %  Outcome: tolerated well, no immediate complications  Procedure, treatment alternatives, risks and benefits explained, specific risks discussed. Consent was given by the patient. Immediately prior to procedure a time out was called to verify the correct patient, procedure, equipment, support staff and site/side marked as required. Patient was prepped and draped in the usual sterile fashion.         Orders:  Orders Placed This Encounter   Procedures    - Small Joint Arthrocentesis: bilateral thumb CMC    XR Wrist 3+ View Right     No orders of the defined types were placed in this encounter.      Dragon dictation utilized  BMI is within normal parameters. No other follow-up for BMI required.       Patient or patient representative verbalized consent for the use of Ambient Listening during the visit with  RANDI Gordillo for chart documentation. 1/31/2025  17:21 EST

## 2025-04-03 ENCOUNTER — TELEPHONE (OUTPATIENT)
Dept: SURGERY | Facility: CLINIC | Age: 50
End: 2025-04-03
Payer: MEDICAID

## (undated) DEVICE — BW-412T DISP COMBO CLEANING BRUSH: Brand: SINGLE USE COMBINATION CLEANING BRUSH

## (undated) DEVICE — SNAR POLYP CAPTIFLX WD OVL 27MM 240CM

## (undated) DEVICE — THE BITE BLOCK MAXI, LATEX FREE STRAP IS USED TO PROTECT THE ENDOSCOPE INSERTION TUBE FROM BEING BITTEN BY THE PATIENT.

## (undated) DEVICE — SPNG GZ WOVN 4X4IN 12PLY 10/BX STRL

## (undated) DEVICE — TRAP POLYP 4CHAMBER

## (undated) DEVICE — VIAL FORMALIN CAP 10P 40ML

## (undated) DEVICE — SUCTION CANISTER, 3000CC,SAFELINER: Brand: DEROYAL

## (undated) DEVICE — KT ORCA ORCAPOD DISP STRL

## (undated) DEVICE — Device

## (undated) DEVICE — LAB CORP AGAR SLANT UREA PK/10

## (undated) DEVICE — GOWN ISOL W/THUMB UNIV BLU BX/15

## (undated) DEVICE — FRCP BX RADJAW4 NDL 2.8 240CM LG OG BX40

## (undated) DEVICE — Device: Brand: DEFENDO AIR/WATER/SUCTION AND BIOPSY VALVE

## (undated) DEVICE — SYR LL 3CC

## (undated) DEVICE — PAD GRND E/S W/CORD SPLT A/

## (undated) DEVICE — FRCP BX RADJAW4 NDL 3.2 240CM JUMBO ONG

## (undated) DEVICE — MASK,FACE,SHIELD,BLUE,ANTI FOG,TIES: Brand: MEDLINE

## (undated) DEVICE — SYR LUER SLPTP 50ML